# Patient Record
Sex: MALE | Race: WHITE | NOT HISPANIC OR LATINO | Employment: FULL TIME | URBAN - METROPOLITAN AREA
[De-identification: names, ages, dates, MRNs, and addresses within clinical notes are randomized per-mention and may not be internally consistent; named-entity substitution may affect disease eponyms.]

---

## 2018-06-19 PROBLEM — I10 MODERATE HYPERTENSION: Status: ACTIVE | Noted: 2018-06-19

## 2019-07-08 PROBLEM — Z00.00 WELL ADULT EXAM: Status: ACTIVE | Noted: 2019-07-08

## 2019-07-08 PROBLEM — Z12.5 SCREENING FOR PROSTATE CANCER: Status: ACTIVE | Noted: 2019-07-08

## 2019-07-08 PROBLEM — Z79.899 ENCOUNTER FOR LONG-TERM (CURRENT) USE OF MEDICATIONS: Status: ACTIVE | Noted: 2019-07-08

## 2021-03-24 ENCOUNTER — TELEPHONE (OUTPATIENT)
Dept: FAMILY MEDICINE | Facility: CLINIC | Age: 51
End: 2021-03-24

## 2021-04-13 ENCOUNTER — OFFICE VISIT (OUTPATIENT)
Dept: FAMILY MEDICINE | Facility: CLINIC | Age: 51
End: 2021-04-13
Payer: COMMERCIAL

## 2021-04-13 ENCOUNTER — LAB VISIT (OUTPATIENT)
Dept: LAB | Facility: HOSPITAL | Age: 51
End: 2021-04-13
Attending: FAMILY MEDICINE
Payer: COMMERCIAL

## 2021-04-13 VITALS
OXYGEN SATURATION: 97 % | SYSTOLIC BLOOD PRESSURE: 132 MMHG | HEART RATE: 87 BPM | HEIGHT: 68 IN | TEMPERATURE: 98 F | BODY MASS INDEX: 30.29 KG/M2 | DIASTOLIC BLOOD PRESSURE: 92 MMHG | WEIGHT: 199.88 LBS

## 2021-04-13 DIAGNOSIS — J93.83 SPONTANEOUS PNEUMOTHORAX: ICD-10-CM

## 2021-04-13 DIAGNOSIS — R53.83 FATIGUE, UNSPECIFIED TYPE: ICD-10-CM

## 2021-04-13 DIAGNOSIS — Z23 IMMUNIZATION DUE: ICD-10-CM

## 2021-04-13 DIAGNOSIS — Z00.00 WELLNESS EXAMINATION: Primary | ICD-10-CM

## 2021-04-13 DIAGNOSIS — Z00.00 WELLNESS EXAMINATION: ICD-10-CM

## 2021-04-13 DIAGNOSIS — R06.83 SNORING: ICD-10-CM

## 2021-04-13 DIAGNOSIS — I10 ESSENTIAL HYPERTENSION: ICD-10-CM

## 2021-04-13 LAB
ALBUMIN SERPL BCP-MCNC: 4.2 G/DL (ref 3.5–5.2)
ALP SERPL-CCNC: 58 U/L (ref 55–135)
ALT SERPL W/O P-5'-P-CCNC: 26 U/L (ref 10–44)
ANION GAP SERPL CALC-SCNC: 8 MMOL/L (ref 8–16)
AST SERPL-CCNC: 23 U/L (ref 10–40)
BASOPHILS # BLD AUTO: 0.02 K/UL (ref 0–0.2)
BASOPHILS NFR BLD: 0.4 % (ref 0–1.9)
BILIRUB SERPL-MCNC: 1 MG/DL (ref 0.1–1)
BUN SERPL-MCNC: 14 MG/DL (ref 6–20)
CALCIUM SERPL-MCNC: 8.7 MG/DL (ref 8.7–10.5)
CHLORIDE SERPL-SCNC: 104 MMOL/L (ref 95–110)
CHOLEST SERPL-MCNC: 139 MG/DL (ref 120–199)
CHOLEST/HDLC SERPL: 2.6 {RATIO} (ref 2–5)
CO2 SERPL-SCNC: 25 MMOL/L (ref 23–29)
COMPLEXED PSA SERPL-MCNC: 0.55 NG/ML (ref 0–4)
CREAT SERPL-MCNC: 1.3 MG/DL (ref 0.5–1.4)
DIFFERENTIAL METHOD: ABNORMAL
EOSINOPHIL # BLD AUTO: 0 K/UL (ref 0–0.5)
EOSINOPHIL NFR BLD: 0.5 % (ref 0–8)
ERYTHROCYTE [DISTWIDTH] IN BLOOD BY AUTOMATED COUNT: 11.7 % (ref 11.5–14.5)
EST. GFR  (AFRICAN AMERICAN): >60 ML/MIN/1.73 M^2
EST. GFR  (NON AFRICAN AMERICAN): >60 ML/MIN/1.73 M^2
GLUCOSE SERPL-MCNC: 102 MG/DL (ref 70–110)
HCT VFR BLD AUTO: 44.8 % (ref 40–54)
HDLC SERPL-MCNC: 53 MG/DL (ref 40–75)
HDLC SERPL: 38.1 % (ref 20–50)
HGB BLD-MCNC: 15.6 G/DL (ref 14–18)
IMM GRANULOCYTES # BLD AUTO: 0.01 K/UL (ref 0–0.04)
IMM GRANULOCYTES NFR BLD AUTO: 0.2 % (ref 0–0.5)
LDLC SERPL CALC-MCNC: 76.8 MG/DL (ref 63–159)
LYMPHOCYTES # BLD AUTO: 1.2 K/UL (ref 1–4.8)
LYMPHOCYTES NFR BLD: 21.9 % (ref 18–48)
MCH RBC QN AUTO: 32 PG (ref 27–31)
MCHC RBC AUTO-ENTMCNC: 34.8 G/DL (ref 32–36)
MCV RBC AUTO: 92 FL (ref 82–98)
MONOCYTES # BLD AUTO: 0.5 K/UL (ref 0.3–1)
MONOCYTES NFR BLD: 8.6 % (ref 4–15)
NEUTROPHILS # BLD AUTO: 3.8 K/UL (ref 1.8–7.7)
NEUTROPHILS NFR BLD: 68.4 % (ref 38–73)
NONHDLC SERPL-MCNC: 86 MG/DL
NRBC BLD-RTO: 0 /100 WBC
PLATELET # BLD AUTO: 249 K/UL (ref 150–450)
PMV BLD AUTO: 11.3 FL (ref 9.2–12.9)
POTASSIUM SERPL-SCNC: 4.9 MMOL/L (ref 3.5–5.1)
PROT SERPL-MCNC: 7.4 G/DL (ref 6–8.4)
RBC # BLD AUTO: 4.87 M/UL (ref 4.6–6.2)
SODIUM SERPL-SCNC: 137 MMOL/L (ref 136–145)
TRIGL SERPL-MCNC: 46 MG/DL (ref 30–150)
WBC # BLD AUTO: 5.48 K/UL (ref 3.9–12.7)

## 2021-04-13 PROCEDURE — 84153 ASSAY OF PSA TOTAL: CPT | Performed by: FAMILY MEDICINE

## 2021-04-13 PROCEDURE — 86803 HEPATITIS C AB TEST: CPT | Performed by: FAMILY MEDICINE

## 2021-04-13 PROCEDURE — 85025 COMPLETE CBC W/AUTO DIFF WBC: CPT | Performed by: FAMILY MEDICINE

## 2021-04-13 PROCEDURE — 36415 COLL VENOUS BLD VENIPUNCTURE: CPT | Mod: PN | Performed by: FAMILY MEDICINE

## 2021-04-13 PROCEDURE — 80053 COMPREHEN METABOLIC PANEL: CPT | Performed by: FAMILY MEDICINE

## 2021-04-13 PROCEDURE — 99999 PR PBB SHADOW E&M-EST. PATIENT-LVL IV: ICD-10-PCS | Mod: PBBFAC,,, | Performed by: FAMILY MEDICINE

## 2021-04-13 PROCEDURE — 80061 LIPID PANEL: CPT | Performed by: FAMILY MEDICINE

## 2021-04-13 PROCEDURE — 99386 PREV VISIT NEW AGE 40-64: CPT | Mod: S$GLB,,, | Performed by: FAMILY MEDICINE

## 2021-04-13 PROCEDURE — 86703 HIV-1/HIV-2 1 RESULT ANTBDY: CPT | Performed by: FAMILY MEDICINE

## 2021-04-13 PROCEDURE — 99999 PR PBB SHADOW E&M-EST. PATIENT-LVL IV: CPT | Mod: PBBFAC,,, | Performed by: FAMILY MEDICINE

## 2021-04-13 PROCEDURE — 99386 PR PREVENTIVE VISIT,NEW,40-64: ICD-10-PCS | Mod: S$GLB,,, | Performed by: FAMILY MEDICINE

## 2021-04-13 RX ORDER — LOSARTAN POTASSIUM 50 MG/1
50 TABLET ORAL DAILY
Qty: 90 TABLET | Refills: 3 | Status: SHIPPED | OUTPATIENT
Start: 2021-04-13 | End: 2022-10-10

## 2021-04-14 LAB — HCV AB SERPL QL IA: NEGATIVE

## 2021-04-15 LAB — HIV 1+2 AB+HIV1 P24 AG SERPL QL IA: NEGATIVE

## 2021-05-10 ENCOUNTER — PATIENT MESSAGE (OUTPATIENT)
Dept: RESEARCH | Facility: HOSPITAL | Age: 51
End: 2021-05-10

## 2021-06-23 ENCOUNTER — OFFICE VISIT (OUTPATIENT)
Dept: FAMILY MEDICINE | Facility: CLINIC | Age: 51
End: 2021-06-23
Payer: COMMERCIAL

## 2021-06-23 ENCOUNTER — TELEPHONE (OUTPATIENT)
Dept: GASTROENTEROLOGY | Facility: CLINIC | Age: 51
End: 2021-06-23

## 2021-06-23 VITALS
TEMPERATURE: 98 F | SYSTOLIC BLOOD PRESSURE: 138 MMHG | HEIGHT: 68 IN | HEART RATE: 83 BPM | OXYGEN SATURATION: 98 % | BODY MASS INDEX: 30.69 KG/M2 | WEIGHT: 202.5 LBS | DIASTOLIC BLOOD PRESSURE: 84 MMHG

## 2021-06-23 DIAGNOSIS — G47.33 MILD OBSTRUCTIVE SLEEP APNEA: ICD-10-CM

## 2021-06-23 DIAGNOSIS — I10 ESSENTIAL HYPERTENSION: Primary | ICD-10-CM

## 2021-06-23 DIAGNOSIS — Z12.11 COLON CANCER SCREENING: ICD-10-CM

## 2021-06-23 PROCEDURE — 99214 OFFICE O/P EST MOD 30 MIN: CPT | Mod: S$GLB,,, | Performed by: FAMILY MEDICINE

## 2021-06-23 PROCEDURE — 99214 PR OFFICE/OUTPT VISIT, EST, LEVL IV, 30-39 MIN: ICD-10-PCS | Mod: S$GLB,,, | Performed by: FAMILY MEDICINE

## 2021-06-23 PROCEDURE — 99999 PR PBB SHADOW E&M-EST. PATIENT-LVL III: ICD-10-PCS | Mod: PBBFAC,,, | Performed by: FAMILY MEDICINE

## 2021-06-23 PROCEDURE — 99999 PR PBB SHADOW E&M-EST. PATIENT-LVL III: CPT | Mod: PBBFAC,,, | Performed by: FAMILY MEDICINE

## 2021-09-30 ENCOUNTER — TELEPHONE (OUTPATIENT)
Dept: UROLOGY | Facility: CLINIC | Age: 51
End: 2021-09-30

## 2021-09-30 RX ORDER — DIAZEPAM 5 MG/1
5 TABLET ORAL 2 TIMES DAILY
COMMUNITY
Start: 2021-09-21 | End: 2021-12-23 | Stop reason: CLARIF

## 2021-09-30 RX ORDER — OXYCODONE AND ACETAMINOPHEN 5; 325 MG/1; MG/1
1 TABLET ORAL
COMMUNITY
Start: 2021-09-21 | End: 2022-01-07

## 2021-09-30 RX ORDER — DOCUSATE SODIUM 100 MG/1
100 CAPSULE, LIQUID FILLED ORAL 2 TIMES DAILY
COMMUNITY
Start: 2021-09-21 | End: 2022-01-07

## 2021-10-01 ENCOUNTER — OFFICE VISIT (OUTPATIENT)
Dept: UROLOGY | Facility: CLINIC | Age: 51
End: 2021-10-01
Payer: COMMERCIAL

## 2021-10-01 VITALS — BODY MASS INDEX: 30.67 KG/M2 | WEIGHT: 202.38 LBS | HEIGHT: 68 IN

## 2021-10-01 DIAGNOSIS — N20.0 KIDNEY STONE: Primary | ICD-10-CM

## 2021-10-01 PROCEDURE — 99999 PR PBB SHADOW E&M-EST. PATIENT-LVL III: CPT | Mod: PBBFAC,,, | Performed by: UROLOGY

## 2021-10-01 PROCEDURE — 99204 PR OFFICE/OUTPT VISIT, NEW, LEVL IV, 45-59 MIN: ICD-10-PCS | Mod: S$GLB,,, | Performed by: UROLOGY

## 2021-10-01 PROCEDURE — 99999 PR PBB SHADOW E&M-EST. PATIENT-LVL III: ICD-10-PCS | Mod: PBBFAC,,, | Performed by: UROLOGY

## 2021-10-01 PROCEDURE — 99204 OFFICE O/P NEW MOD 45 MIN: CPT | Mod: S$GLB,,, | Performed by: UROLOGY

## 2021-10-04 ENCOUNTER — TELEPHONE (OUTPATIENT)
Dept: FAMILY MEDICINE | Facility: CLINIC | Age: 51
End: 2021-10-04

## 2021-10-04 DIAGNOSIS — Z12.11 COLON CANCER SCREENING: Primary | ICD-10-CM

## 2021-10-05 ENCOUNTER — TELEPHONE (OUTPATIENT)
Dept: FAMILY MEDICINE | Facility: CLINIC | Age: 51
End: 2021-10-05

## 2021-10-06 ENCOUNTER — TELEPHONE (OUTPATIENT)
Dept: FAMILY MEDICINE | Facility: CLINIC | Age: 51
End: 2021-10-06

## 2021-10-06 DIAGNOSIS — Z12.11 COLON CANCER SCREENING: Primary | ICD-10-CM

## 2021-10-07 ENCOUNTER — TELEPHONE (OUTPATIENT)
Dept: GASTROENTEROLOGY | Facility: CLINIC | Age: 51
End: 2021-10-07

## 2021-10-25 ENCOUNTER — TELEPHONE (OUTPATIENT)
Dept: UROLOGY | Facility: CLINIC | Age: 51
End: 2021-10-25
Payer: COMMERCIAL

## 2021-10-26 ENCOUNTER — TELEPHONE (OUTPATIENT)
Dept: UROLOGY | Facility: CLINIC | Age: 51
End: 2021-10-26
Payer: COMMERCIAL

## 2021-10-28 ENCOUNTER — TELEPHONE (OUTPATIENT)
Dept: FAMILY MEDICINE | Facility: CLINIC | Age: 51
End: 2021-10-28
Payer: COMMERCIAL

## 2021-10-29 ENCOUNTER — TELEPHONE (OUTPATIENT)
Dept: UROLOGY | Facility: CLINIC | Age: 51
End: 2021-10-29
Payer: COMMERCIAL

## 2021-11-05 ENCOUNTER — TELEPHONE (OUTPATIENT)
Dept: UROLOGY | Facility: CLINIC | Age: 51
End: 2021-11-05
Payer: COMMERCIAL

## 2021-11-08 ENCOUNTER — TELEPHONE (OUTPATIENT)
Dept: UROLOGY | Facility: CLINIC | Age: 51
End: 2021-11-08
Payer: COMMERCIAL

## 2021-11-08 DIAGNOSIS — N20.0 KIDNEY STONE: Primary | ICD-10-CM

## 2021-11-19 ENCOUNTER — TELEPHONE (OUTPATIENT)
Dept: UROLOGY | Facility: CLINIC | Age: 51
End: 2021-11-19
Payer: COMMERCIAL

## 2021-11-19 DIAGNOSIS — N20.0 KIDNEY STONE: Primary | ICD-10-CM

## 2021-12-29 ENCOUNTER — PATIENT MESSAGE (OUTPATIENT)
Dept: UROLOGY | Facility: CLINIC | Age: 51
End: 2021-12-29
Payer: COMMERCIAL

## 2021-12-29 ENCOUNTER — NURSE TRIAGE (OUTPATIENT)
Dept: ADMINISTRATIVE | Facility: CLINIC | Age: 51
End: 2021-12-29
Payer: COMMERCIAL

## 2021-12-29 PROBLEM — N20.0 KIDNEY STONE: Status: ACTIVE | Noted: 2021-12-29

## 2021-12-30 ENCOUNTER — HOSPITAL ENCOUNTER (OUTPATIENT)
Dept: RADIOLOGY | Facility: HOSPITAL | Age: 51
Discharge: HOME | End: 2021-12-30
Attending: UROLOGY
Payer: COMMERCIAL

## 2021-12-30 ENCOUNTER — TELEPHONE (OUTPATIENT)
Dept: UROLOGY | Facility: CLINIC | Age: 51
End: 2021-12-30
Payer: COMMERCIAL

## 2021-12-30 DIAGNOSIS — N20.0 KIDNEY STONE: ICD-10-CM

## 2021-12-30 DIAGNOSIS — N20.0 KIDNEY STONE: Primary | ICD-10-CM

## 2021-12-30 PROCEDURE — 74176 CT RENAL STONE STUDY ABD PELVIS WO: ICD-10-PCS | Mod: 26,,, | Performed by: RADIOLOGY

## 2021-12-30 PROCEDURE — 74176 CT ABD & PELVIS W/O CONTRAST: CPT | Mod: TC,PO

## 2021-12-30 PROCEDURE — 74176 CT ABD & PELVIS W/O CONTRAST: CPT | Mod: 26,,, | Performed by: RADIOLOGY

## 2021-12-31 PROBLEM — N20.1 URETERAL STONE: Status: ACTIVE | Noted: 2021-12-31

## 2022-01-03 ENCOUNTER — TELEPHONE (OUTPATIENT)
Dept: UROLOGY | Facility: CLINIC | Age: 52
End: 2022-01-03
Payer: COMMERCIAL

## 2022-01-03 ENCOUNTER — PATIENT MESSAGE (OUTPATIENT)
Dept: UROLOGY | Facility: CLINIC | Age: 52
End: 2022-01-03
Payer: COMMERCIAL

## 2022-01-03 DIAGNOSIS — J90 PLEURAL EFFUSION: Primary | ICD-10-CM

## 2022-01-03 RX ORDER — POLYETHYLENE GLYCOL 3350 17 G/17G
POWDER, FOR SOLUTION ORAL
COMMUNITY
Start: 2021-10-25 | End: 2022-01-07

## 2022-01-03 RX ORDER — PROMETHAZINE HYDROCHLORIDE 25 MG/1
25 TABLET ORAL
COMMUNITY
Start: 2021-10-25 | End: 2022-01-07

## 2022-01-03 RX ORDER — ERYTHROMYCIN 5 MG/G
OINTMENT OPHTHALMIC
COMMUNITY
Start: 2021-12-20 | End: 2022-01-07

## 2022-01-03 RX ORDER — OXYBUTYNIN CHLORIDE 5 MG/1
5 TABLET ORAL 3 TIMES DAILY
COMMUNITY
Start: 2021-12-31 | End: 2022-01-27 | Stop reason: CLARIF

## 2022-01-03 RX ORDER — CALCIUM CARBONATE/VITAMIN D3 400-133.3
TABLET ORAL
COMMUNITY
Start: 2021-10-25 | End: 2022-01-07

## 2022-01-03 NOTE — TELEPHONE ENCOUNTER
----- Message from Ligia Stern sent at 1/3/2022  8:17 AM CST -----  Regarding: appointment  Contact: Wife, Luana Craft want to speak with a nurse regarding scheduling follow up appointment today please call back at [phone]    Case number [id number]

## 2022-01-07 ENCOUNTER — HOSPITAL ENCOUNTER (OUTPATIENT)
Dept: RADIOLOGY | Facility: HOSPITAL | Age: 52
Discharge: HOME | End: 2022-01-07
Attending: UROLOGY
Payer: COMMERCIAL

## 2022-01-07 ENCOUNTER — PATIENT MESSAGE (OUTPATIENT)
Dept: UROLOGY | Facility: CLINIC | Age: 52
End: 2022-01-07

## 2022-01-07 ENCOUNTER — OFFICE VISIT (OUTPATIENT)
Dept: UROLOGY | Facility: CLINIC | Age: 52
End: 2022-01-07
Payer: COMMERCIAL

## 2022-01-07 ENCOUNTER — TELEPHONE (OUTPATIENT)
Dept: UROLOGY | Facility: CLINIC | Age: 52
End: 2022-01-07

## 2022-01-07 VITALS — BODY MASS INDEX: 28.9 KG/M2 | HEIGHT: 68 IN | WEIGHT: 190.69 LBS

## 2022-01-07 DIAGNOSIS — J90 PLEURAL EFFUSION: ICD-10-CM

## 2022-01-07 DIAGNOSIS — N20.0 KIDNEY STONE: Primary | ICD-10-CM

## 2022-01-07 DIAGNOSIS — N20.0 KIDNEY STONE: ICD-10-CM

## 2022-01-07 DIAGNOSIS — J90 PLEURAL EFFUSION: Primary | ICD-10-CM

## 2022-01-07 PROCEDURE — 71046 X-RAY EXAM CHEST 2 VIEWS: CPT | Mod: TC,FY,PO

## 2022-01-07 PROCEDURE — 99999 PR PBB SHADOW E&M-EST. PATIENT-LVL III: ICD-10-PCS | Mod: PBBFAC,,, | Performed by: UROLOGY

## 2022-01-07 PROCEDURE — 99024 PR POST-OP FOLLOW-UP VISIT: ICD-10-PCS | Mod: S$GLB,,, | Performed by: UROLOGY

## 2022-01-07 PROCEDURE — 1160F PR REVIEW ALL MEDS BY PRESCRIBER/CLIN PHARMACIST DOCUMENTED: ICD-10-PCS | Mod: CPTII,S$GLB,, | Performed by: UROLOGY

## 2022-01-07 PROCEDURE — 3008F BODY MASS INDEX DOCD: CPT | Mod: CPTII,S$GLB,, | Performed by: UROLOGY

## 2022-01-07 PROCEDURE — 99024 POSTOP FOLLOW-UP VISIT: CPT | Mod: S$GLB,,, | Performed by: UROLOGY

## 2022-01-07 PROCEDURE — 71046 X-RAY EXAM CHEST 2 VIEWS: CPT | Mod: 26,,, | Performed by: RADIOLOGY

## 2022-01-07 PROCEDURE — 1159F MED LIST DOCD IN RCRD: CPT | Mod: CPTII,S$GLB,, | Performed by: UROLOGY

## 2022-01-07 PROCEDURE — 71046 XR CHEST PA AND LATERAL: ICD-10-PCS | Mod: 26,,, | Performed by: RADIOLOGY

## 2022-01-07 PROCEDURE — 99999 PR PBB SHADOW E&M-EST. PATIENT-LVL III: CPT | Mod: PBBFAC,,, | Performed by: UROLOGY

## 2022-01-07 PROCEDURE — 3008F PR BODY MASS INDEX (BMI) DOCUMENTED: ICD-10-PCS | Mod: CPTII,S$GLB,, | Performed by: UROLOGY

## 2022-01-07 PROCEDURE — 1160F RVW MEDS BY RX/DR IN RCRD: CPT | Mod: CPTII,S$GLB,, | Performed by: UROLOGY

## 2022-01-07 PROCEDURE — 1159F PR MEDICATION LIST DOCUMENTED IN MEDICAL RECORD: ICD-10-PCS | Mod: CPTII,S$GLB,, | Performed by: UROLOGY

## 2022-01-07 NOTE — LETTER
2022    YOANDY Randle M.D.  [address] [place] [address]      Re: Lonnie Vega          1970    To whom it may concern:    Mr Vega is presently under my care and has had surgery recently for kidney stone.  At this time he is not medically fit to fly.    If there is any further information that is needed, please do not hesitate to contact this office.    Thank you,        YOANDY Randle M.D.

## 2022-01-07 NOTE — PROGRESS NOTES
Subjective:       Patient ID: Lonnie Vega is a 51 y.o. male.    Chief Complaint: post op    HPI     51-year-old with a large left kidney stone.  Status post left PCNL complicated by left pleural effusion.  He underwent pleurocentesis.  He is overall improved today.  He still has some pain.  He has mild shortness of breath but he feels that this has improved.  Reviewed his last series of images.  He still has a 1.2 cm stone fragment and upper pole calyx which could not removed through the percutaneous site.  He is here to discuss treatment options.  We discussed shockwave lithotripsy versus ureteroscopy.  The stone was fairly dense and composed of calcium oxalate monohydrate.    Review of Systems   Constitutional: Negative for fever.   Genitourinary: Negative for dysuria and hematuria.       Objective:      Physical Exam  Vitals reviewed.   Constitutional:       Appearance: He is well-developed and well-nourished.   Pulmonary:      Effort: Pulmonary effort is normal.   Skin:     Findings: No rash.   Neurological:      Mental Status: He is alert and oriented to person, place, and time.   Psychiatric:         Mood and Affect: Mood and affect normal.         Assessment:       1. Kidney stone        Plan:       Kidney stone      repeat chest x-ray.  Will plan to proceed with left-sided ureteroscopy in a couple of weeks.

## 2022-01-07 NOTE — LETTER
2022    YOANDY Randle M.D.  [address] [place] [address]      Re: Lonnie Vega          1970    To whom it may concern:    Mr Vega is presently under my care for a complicated obstructed kidney stone.  At this time he is not medically fit to travel.    If there is any further information that is needed, please do not hesitate to contact this office.    Thank you,        YOANDY Randle M.D.

## 2022-01-10 ENCOUNTER — TELEPHONE (OUTPATIENT)
Dept: UROLOGY | Facility: CLINIC | Age: 52
End: 2022-01-10
Payer: COMMERCIAL

## 2022-01-11 ENCOUNTER — TELEPHONE (OUTPATIENT)
Dept: UROLOGY | Facility: CLINIC | Age: 52
End: 2022-01-11
Payer: COMMERCIAL

## 2022-01-11 DIAGNOSIS — Z01.818 PRE-OP TESTING: Primary | ICD-10-CM

## 2022-01-11 NOTE — TELEPHONE ENCOUNTER
----- Message from Ashleigh Meyers sent at 1/11/2022 12:31 PM CST -----  Type: Needs Medical Advice  Who Called:  pt wife Luz Elena Espana Call Back Number: [phone] (home)     Additional Information: Pt is scheduled for a procedure but would llike to push it back a week per his wife. Please call to advise

## 2022-01-11 NOTE — TELEPHONE ENCOUNTER
Unable to return patient call as vm is full, returning patient wife telephone call regarding rescheduling procedure. Patient has been moved to 2/1/2022 , will need Covid testing to be done here @[facility] on 1/29/2022.

## 2022-01-24 ENCOUNTER — PATIENT MESSAGE (OUTPATIENT)
Dept: UROLOGY | Facility: CLINIC | Age: 52
End: 2022-01-24
Payer: COMMERCIAL

## 2022-01-24 NOTE — TELEPHONE ENCOUNTER
A little bit of blood would not be very concerning with an indwelling ureteral stent.  If he feels he has an infection, please have him drop off a urine specimen this week so we can test it before his surgery next week

## 2022-01-31 ENCOUNTER — PATIENT MESSAGE (OUTPATIENT)
Dept: UROLOGY | Facility: CLINIC | Age: 52
End: 2022-01-31
Payer: COMMERCIAL

## 2022-01-31 ENCOUNTER — TELEPHONE (OUTPATIENT)
Dept: UROLOGY | Facility: CLINIC | Age: 52
End: 2022-01-31
Payer: COMMERCIAL

## 2022-01-31 NOTE — TELEPHONE ENCOUNTER
----- Message from Denice Fleming sent at 1/31/2022  1:23 PM CST -----  .Type: Needs Medical Advice  Who Called:  Pt  Best Call Back Number: [phone]  Additional Information: Pt requesting to speak with a nurse to get instructions and arrival time for his procedure scheduled for tomorrow.  Please advise  Thanks

## 2022-02-04 ENCOUNTER — PATIENT MESSAGE (OUTPATIENT)
Dept: UROLOGY | Facility: CLINIC | Age: 52
End: 2022-02-04
Payer: COMMERCIAL

## 2022-02-11 ENCOUNTER — TELEPHONE (OUTPATIENT)
Dept: UROLOGY | Facility: CLINIC | Age: 52
End: 2022-02-11
Payer: COMMERCIAL

## 2022-02-21 RX ORDER — OLMESARTAN MEDOXOMIL 40 MG/1
20 TABLET ORAL DAILY
COMMUNITY
Start: 2022-01-31

## 2022-02-21 RX ORDER — DIAZEPAM 5 MG/1
TABLET ORAL
COMMUNITY
End: 2022-10-10

## 2022-07-27 ENCOUNTER — TELEPHONE (OUTPATIENT)
Dept: UROLOGY | Facility: CLINIC | Age: 52
End: 2022-07-27
Payer: COMMERCIAL

## 2022-07-27 NOTE — TELEPHONE ENCOUNTER
----- Message from Millie Hobbs sent at 7/27/2022 11:59 AM CDT -----  Type:  Patient Call Back    Who Called: Pt     What is the reqeust in detail: Pt is requesting a call back in regards to medical records for another provider  Dr. Aaron Nunez Phone [phone] fax [phone] . Please Advise     Can the clinic reply by [facility]?    Best Call Back Number: [phone]

## 2022-10-03 ENCOUNTER — TELEPHONE (OUTPATIENT)
Dept: OPHTHALMOLOGY | Facility: CLINIC | Age: 52
End: 2022-10-03
Payer: COMMERCIAL

## 2022-10-03 NOTE — TELEPHONE ENCOUNTER
----- Message from Dang Olivier sent at 9/30/2022  4:14 PM CDT -----  Please call pt to answer questions

## 2022-11-16 ENCOUNTER — OFFICE VISIT (OUTPATIENT)
Dept: OPHTHALMOLOGY | Facility: CLINIC | Age: 52
End: 2022-11-16
Payer: COMMERCIAL

## 2022-11-16 DIAGNOSIS — Z94.7 STATUS POST CORNEAL TRANSPLANT: ICD-10-CM

## 2022-11-16 DIAGNOSIS — H18.623 KERATOCONUS, UNSTABLE, BILATERAL: Primary | ICD-10-CM

## 2022-11-16 PROCEDURE — 1159F MED LIST DOCD IN RCRD: CPT | Mod: CPTII,S$GLB,, | Performed by: OPHTHALMOLOGY

## 2022-11-16 PROCEDURE — 4010F ACE/ARB THERAPY RXD/TAKEN: CPT | Mod: CPTII,S$GLB,, | Performed by: OPHTHALMOLOGY

## 2022-11-16 PROCEDURE — 92004 PR EYE EXAM, NEW PATIENT,COMPREHESV: ICD-10-PCS | Mod: S$GLB,,, | Performed by: OPHTHALMOLOGY

## 2022-11-16 PROCEDURE — 4010F PR ACE/ARB THEARPY RXD/TAKEN: ICD-10-PCS | Mod: CPTII,S$GLB,, | Performed by: OPHTHALMOLOGY

## 2022-11-16 PROCEDURE — 99999 PR PBB SHADOW E&M-EST. PATIENT-LVL III: ICD-10-PCS | Mod: PBBFAC,,, | Performed by: OPHTHALMOLOGY

## 2022-11-16 PROCEDURE — 92004 COMPRE OPH EXAM NEW PT 1/>: CPT | Mod: S$GLB,,, | Performed by: OPHTHALMOLOGY

## 2022-11-16 PROCEDURE — 99999 PR PBB SHADOW E&M-EST. PATIENT-LVL III: CPT | Mod: PBBFAC,,, | Performed by: OPHTHALMOLOGY

## 2022-11-16 PROCEDURE — 1159F PR MEDICATION LIST DOCUMENTED IN MEDICAL RECORD: ICD-10-PCS | Mod: CPTII,S$GLB,, | Performed by: OPHTHALMOLOGY

## 2022-11-16 NOTE — PROGRESS NOTES
HPI    51 y/o male is here for cornea evaluation.  He has had K transplant OS   2001 and wears RGP's OU.  He is having trouble with OS due to a large   amount OD astigmatism.    K transplant 2001  No drops  Last edited by Whit Fleming on 11/16/2022 10:55 AM.            Assessment /Plan     For exam results, see Encounter Report.    Keratoconus, unstable, bilateral    Status post corneal transplant      PKP OS x 20yrs with 10D cyl clear graft  Diff with Sclerals, but good vision when wearing  Monitor for graft failure, then will consider graft revision

## 2023-02-11 ENCOUNTER — HOSPITAL ENCOUNTER (EMERGENCY)
Facility: HOSPITAL | Age: 53
Discharge: HOME | End: 2023-02-11
Attending: EMERGENCY MEDICINE
Payer: COMMERCIAL

## 2023-02-11 ENCOUNTER — ANESTHESIA EVENT (OUTPATIENT)
Dept: SURGERY | Facility: HOSPITAL | Age: 53
End: 2023-02-11
Payer: COMMERCIAL

## 2023-02-11 ENCOUNTER — ANESTHESIA (OUTPATIENT)
Dept: SURGERY | Facility: HOSPITAL | Age: 53
End: 2023-02-11
Payer: COMMERCIAL

## 2023-02-11 ENCOUNTER — TELEPHONE (OUTPATIENT)
Dept: OPHTHALMOLOGY | Facility: CLINIC | Age: 53
End: 2023-02-11
Payer: COMMERCIAL

## 2023-02-11 VITALS
TEMPERATURE: 98 F | SYSTOLIC BLOOD PRESSURE: 117 MMHG | RESPIRATION RATE: 15 BRPM | BODY MASS INDEX: 29.65 KG/M2 | WEIGHT: 195 LBS | DIASTOLIC BLOOD PRESSURE: 61 MMHG | HEART RATE: 74 BPM | OXYGEN SATURATION: 94 %

## 2023-02-11 DIAGNOSIS — S05.32XA RUPTURE OF GLOBE OF EYE FOLLOWING BLUNT TRAUMA, LEFT, INITIAL ENCOUNTER: ICD-10-CM

## 2023-02-11 DIAGNOSIS — S05.32XA RUPTURED GLOBE OF LEFT EYE, INITIAL ENCOUNTER: Primary | ICD-10-CM

## 2023-02-11 PROCEDURE — 37000008 HC ANESTHESIA 1ST 15 MINUTES: Performed by: OPHTHALMOLOGY

## 2023-02-11 PROCEDURE — 99214 PR OFFICE/OUTPT VISIT, EST, LEVL IV, 30-39 MIN: ICD-10-PCS | Mod: 57,,, | Performed by: OPHTHALMOLOGY

## 2023-02-11 PROCEDURE — 96365 THER/PROPH/DIAG IV INF INIT: CPT

## 2023-02-11 PROCEDURE — 96367 TX/PROPH/DG ADDL SEQ IV INF: CPT

## 2023-02-11 PROCEDURE — 71000033 HC RECOVERY, INTIAL HOUR: Performed by: OPHTHALMOLOGY

## 2023-02-11 PROCEDURE — 63600175 PHARM REV CODE 636 W HCPCS: Performed by: OPHTHALMOLOGY

## 2023-02-11 PROCEDURE — 66250 FOLLOW-UP SURGERY OF EYE: CPT | Mod: LT,,, | Performed by: OPHTHALMOLOGY

## 2023-02-11 PROCEDURE — 63600175 PHARM REV CODE 636 W HCPCS: Performed by: NURSE ANESTHETIST, CERTIFIED REGISTERED

## 2023-02-11 PROCEDURE — 36000707: Performed by: OPHTHALMOLOGY

## 2023-02-11 PROCEDURE — 99214 OFFICE O/P EST MOD 30 MIN: CPT | Mod: 57,,, | Performed by: OPHTHALMOLOGY

## 2023-02-11 PROCEDURE — 25000003 PHARM REV CODE 250: Performed by: EMERGENCY MEDICINE

## 2023-02-11 PROCEDURE — 37000009 HC ANESTHESIA EA ADD 15 MINS: Performed by: OPHTHALMOLOGY

## 2023-02-11 PROCEDURE — 36000706: Performed by: OPHTHALMOLOGY

## 2023-02-11 PROCEDURE — 25000003 PHARM REV CODE 250: Performed by: STUDENT IN AN ORGANIZED HEALTH CARE EDUCATION/TRAINING PROGRAM

## 2023-02-11 PROCEDURE — 63600175 PHARM REV CODE 636 W HCPCS: Performed by: EMERGENCY MEDICINE

## 2023-02-11 PROCEDURE — 99285 EMERGENCY DEPT VISIT HI MDM: CPT | Mod: 25

## 2023-02-11 PROCEDURE — 99284 PR EMERGENCY DEPT VISIT,LEVEL IV: ICD-10-PCS | Mod: FS,,, | Performed by: EMERGENCY MEDICINE

## 2023-02-11 PROCEDURE — 96375 TX/PRO/DX INJ NEW DRUG ADDON: CPT

## 2023-02-11 PROCEDURE — 25000003 PHARM REV CODE 250: Performed by: OPHTHALMOLOGY

## 2023-02-11 PROCEDURE — D9220A PRA ANESTHESIA: ICD-10-PCS | Mod: CRNA,,, | Performed by: NURSE ANESTHETIST, CERTIFIED REGISTERED

## 2023-02-11 PROCEDURE — 67005 PARTIAL REMOVAL OF EYE FLUID: CPT | Mod: LT,,, | Performed by: OPHTHALMOLOGY

## 2023-02-11 PROCEDURE — 63600175 PHARM REV CODE 636 W HCPCS: Performed by: STUDENT IN AN ORGANIZED HEALTH CARE EDUCATION/TRAINING PROGRAM

## 2023-02-11 PROCEDURE — D9220A PRA ANESTHESIA: Mod: ANES,,, | Performed by: ANESTHESIOLOGY

## 2023-02-11 PROCEDURE — D9220A PRA ANESTHESIA: ICD-10-PCS | Mod: ANES,,, | Performed by: ANESTHESIOLOGY

## 2023-02-11 PROCEDURE — 99284 EMERGENCY DEPT VISIT MOD MDM: CPT | Mod: FS,,, | Performed by: EMERGENCY MEDICINE

## 2023-02-11 PROCEDURE — 67005 PR PART REMV VITREOUS,ANT APPRCH: ICD-10-PCS | Mod: LT,,, | Performed by: OPHTHALMOLOGY

## 2023-02-11 PROCEDURE — 66250: ICD-10-PCS | Mod: LT,,, | Performed by: OPHTHALMOLOGY

## 2023-02-11 PROCEDURE — D9220A PRA ANESTHESIA: Mod: CRNA,,, | Performed by: NURSE ANESTHETIST, CERTIFIED REGISTERED

## 2023-02-11 PROCEDURE — 71000016 HC POSTOP RECOV ADDL HR: Performed by: OPHTHALMOLOGY

## 2023-02-11 PROCEDURE — 25000003 PHARM REV CODE 250: Performed by: NURSE ANESTHETIST, CERTIFIED REGISTERED

## 2023-02-11 PROCEDURE — 71000015 HC POSTOP RECOV 1ST HR: Performed by: OPHTHALMOLOGY

## 2023-02-11 RX ORDER — DEXAMETHASONE SODIUM PHOSPHATE 4 MG/ML
INJECTION, SOLUTION INTRA-ARTICULAR; INTRALESIONAL; INTRAMUSCULAR; INTRAVENOUS; SOFT TISSUE
Status: DISCONTINUED | OUTPATIENT
Start: 2023-02-11 | End: 2023-02-11 | Stop reason: HOSPADM

## 2023-02-11 RX ORDER — ONDANSETRON 2 MG/ML
4 INJECTION INTRAMUSCULAR; INTRAVENOUS
Status: DISCONTINUED | OUTPATIENT
Start: 2023-02-11 | End: 2023-02-11

## 2023-02-11 RX ORDER — ACETAMINOPHEN 325 MG/1
650 TABLET ORAL EVERY 4 HOURS PRN
Status: DISCONTINUED | OUTPATIENT
Start: 2023-02-11 | End: 2023-02-11

## 2023-02-11 RX ORDER — PROPOFOL 10 MG/ML
VIAL (ML) INTRAVENOUS
Status: DISCONTINUED | OUTPATIENT
Start: 2023-02-11 | End: 2023-02-11

## 2023-02-11 RX ORDER — EPINEPHRINE 1 MG/ML
INJECTION, SOLUTION, CONCENTRATE INTRAVENOUS
Status: DISCONTINUED
Start: 2023-02-11 | End: 2023-02-11 | Stop reason: HOSPADM

## 2023-02-11 RX ORDER — HYDROMORPHONE HYDROCHLORIDE 2 MG/ML
INJECTION, SOLUTION INTRAMUSCULAR; INTRAVENOUS; SUBCUTANEOUS
Status: DISCONTINUED | OUTPATIENT
Start: 2023-02-11 | End: 2023-02-11

## 2023-02-11 RX ORDER — MIDAZOLAM HYDROCHLORIDE 1 MG/ML
INJECTION, SOLUTION INTRAMUSCULAR; INTRAVENOUS
Status: DISCONTINUED | OUTPATIENT
Start: 2023-02-11 | End: 2023-02-11

## 2023-02-11 RX ORDER — DEXAMETHASONE SODIUM PHOSPHATE 4 MG/ML
INJECTION, SOLUTION INTRA-ARTICULAR; INTRALESIONAL; INTRAMUSCULAR; INTRAVENOUS; SOFT TISSUE
Status: DISCONTINUED | OUTPATIENT
Start: 2023-02-11 | End: 2023-02-11

## 2023-02-11 RX ORDER — DIPHENHYDRAMINE HYDROCHLORIDE 50 MG/ML
INJECTION INTRAMUSCULAR; INTRAVENOUS
Status: DISCONTINUED | OUTPATIENT
Start: 2023-02-11 | End: 2023-02-11

## 2023-02-11 RX ORDER — CEFAZOLIN SODIUM 1 G/3ML
1 INJECTION, POWDER, FOR SOLUTION INTRAMUSCULAR; INTRAVENOUS
Status: DISCONTINUED | OUTPATIENT
Start: 2023-02-11 | End: 2023-02-11

## 2023-02-11 RX ORDER — ACETAMINOPHEN 10 MG/ML
INJECTION, SOLUTION INTRAVENOUS
Status: DISCONTINUED | OUTPATIENT
Start: 2023-02-11 | End: 2023-02-11

## 2023-02-11 RX ORDER — VANCOMYCIN HYDROCHLORIDE 500 MG/10ML
INJECTION, POWDER, LYOPHILIZED, FOR SOLUTION INTRAVENOUS
Status: DISCONTINUED
Start: 2023-02-11 | End: 2023-02-11 | Stop reason: HOSPADM

## 2023-02-11 RX ORDER — SUCCINYLCHOLINE CHLORIDE 20 MG/ML
INJECTION INTRAMUSCULAR; INTRAVENOUS
Status: DISCONTINUED | OUTPATIENT
Start: 2023-02-11 | End: 2023-02-11

## 2023-02-11 RX ORDER — ONDANSETRON 2 MG/ML
4 INJECTION INTRAMUSCULAR; INTRAVENOUS DAILY PRN
Status: DISCONTINUED | OUTPATIENT
Start: 2023-02-11 | End: 2023-02-11

## 2023-02-11 RX ORDER — ONDANSETRON 2 MG/ML
INJECTION INTRAMUSCULAR; INTRAVENOUS
Status: DISCONTINUED | OUTPATIENT
Start: 2023-02-11 | End: 2023-02-11

## 2023-02-11 RX ORDER — OXYCODONE AND ACETAMINOPHEN 5; 325 MG/1; MG/1
1 TABLET ORAL
Status: COMPLETED | OUTPATIENT
Start: 2023-02-11 | End: 2023-02-11

## 2023-02-11 RX ORDER — MOXIFLOXACIN HYDROCHLORIDE 400 MG/1
400 TABLET ORAL DAILY
Qty: 10 TABLET | Refills: 0 | Status: SHIPPED | OUTPATIENT
Start: 2023-02-11 | End: 2023-02-25

## 2023-02-11 RX ORDER — CIPROFLOXACIN 2 MG/ML
400 INJECTION, SOLUTION INTRAVENOUS
Status: COMPLETED | OUTPATIENT
Start: 2023-02-11 | End: 2023-02-11

## 2023-02-11 RX ORDER — FENTANYL CITRATE 50 UG/ML
INJECTION, SOLUTION INTRAMUSCULAR; INTRAVENOUS
Status: DISCONTINUED | OUTPATIENT
Start: 2023-02-11 | End: 2023-02-11

## 2023-02-11 RX ORDER — PHENYLEPHRINE HYDROCHLORIDE 10 MG/ML
INJECTION INTRAVENOUS
Status: DISCONTINUED | OUTPATIENT
Start: 2023-02-11 | End: 2023-02-11

## 2023-02-11 RX ORDER — DEXAMETHASONE SODIUM PHOSPHATE 4 MG/ML
INJECTION, SOLUTION INTRA-ARTICULAR; INTRALESIONAL; INTRAMUSCULAR; INTRAVENOUS; SOFT TISSUE
Status: DISCONTINUED
Start: 2023-02-11 | End: 2023-02-11 | Stop reason: HOSPADM

## 2023-02-11 RX ORDER — NEOMYCIN SULFATE, POLYMYXIN B SULFATE, AND DEXAMETHASONE 3.5; 10000; 1 MG/G; [USP'U]/G; MG/G
OINTMENT OPHTHALMIC
Status: DISCONTINUED
Start: 2023-02-11 | End: 2023-02-11 | Stop reason: HOSPADM

## 2023-02-11 RX ORDER — HYDROMORPHONE HYDROCHLORIDE 1 MG/ML
0.2 INJECTION, SOLUTION INTRAMUSCULAR; INTRAVENOUS; SUBCUTANEOUS EVERY 5 MIN PRN
Status: DISCONTINUED | OUTPATIENT
Start: 2023-02-11 | End: 2023-02-11

## 2023-02-11 RX ORDER — LIDOCAINE HYDROCHLORIDE 20 MG/ML
INJECTION INTRAVENOUS
Status: DISCONTINUED | OUTPATIENT
Start: 2023-02-11 | End: 2023-02-11

## 2023-02-11 RX ORDER — FENTANYL CITRATE 50 UG/ML
25 INJECTION, SOLUTION INTRAMUSCULAR; INTRAVENOUS EVERY 5 MIN PRN
Status: DISCONTINUED | OUTPATIENT
Start: 2023-02-11 | End: 2023-02-11

## 2023-02-11 RX ORDER — VANCOMYCIN HYDROCHLORIDE 500 MG/10ML
INJECTION, POWDER, LYOPHILIZED, FOR SOLUTION INTRAVENOUS
Status: DISCONTINUED | OUTPATIENT
Start: 2023-02-11 | End: 2023-02-11 | Stop reason: HOSPADM

## 2023-02-11 RX ORDER — MOXIFLOXACIN 5 MG/ML
1 SOLUTION/ DROPS OPHTHALMIC 4 TIMES DAILY
Qty: 3 ML | Refills: 0 | Status: SHIPPED | OUTPATIENT
Start: 2023-02-11 | End: 2023-02-18

## 2023-02-11 RX ORDER — EPINEPHRINE CONVENIENCE KIT 1 MG/ML(1)
KIT INTRAMUSCULAR; SUBCUTANEOUS
Status: DISCONTINUED | OUTPATIENT
Start: 2023-02-11 | End: 2023-02-11 | Stop reason: HOSPADM

## 2023-02-11 RX ORDER — SODIUM CHLORIDE 0.9 % (FLUSH) 0.9 %
10 SYRINGE (ML) INJECTION
Status: DISCONTINUED | OUTPATIENT
Start: 2023-02-11 | End: 2023-02-11

## 2023-02-11 RX ORDER — PREDNISOLONE ACETATE 10 MG/ML
1 SUSPENSION/ DROPS OPHTHALMIC 4 TIMES DAILY
Qty: 5 ML | Refills: 11 | Status: SHIPPED | OUTPATIENT
Start: 2023-02-11 | End: 2023-03-13

## 2023-02-11 RX ADMIN — PROPOFOL 150 MG: 10 INJECTION, EMULSION INTRAVENOUS at 03:02

## 2023-02-11 RX ADMIN — CIPROFLOXACIN 400 MG: 2 INJECTION, SOLUTION INTRAVENOUS at 01:02

## 2023-02-11 RX ADMIN — PHENYLEPHRINE HYDROCHLORIDE 100 MCG: 10 INJECTION INTRAVENOUS at 03:02

## 2023-02-11 RX ADMIN — HYDROMORPHONE HYDROCHLORIDE 0.5 MG: 2 INJECTION INTRAMUSCULAR; INTRAVENOUS; SUBCUTANEOUS at 04:02

## 2023-02-11 RX ADMIN — SUCCINYLCHOLINE CHLORIDE 180 MG: 20 INJECTION, SOLUTION INTRAMUSCULAR; INTRAVENOUS at 03:02

## 2023-02-11 RX ADMIN — OXYCODONE HYDROCHLORIDE AND ACETAMINOPHEN 1 TABLET: 5; 325 TABLET ORAL at 11:02

## 2023-02-11 RX ADMIN — DIPHENHYDRAMINE HYDROCHLORIDE 10 MG: 50 INJECTION, SOLUTION INTRAMUSCULAR; INTRAVENOUS at 03:02

## 2023-02-11 RX ADMIN — ONDANSETRON 8 MG: 2 INJECTION INTRAMUSCULAR; INTRAVENOUS at 03:02

## 2023-02-11 RX ADMIN — DEXTROSE MONOHYDRATE 1 G: 2.5 INJECTION INTRAVENOUS at 02:02

## 2023-02-11 RX ADMIN — FENTANYL CITRATE 100 MCG: 50 INJECTION, SOLUTION INTRAMUSCULAR; INTRAVENOUS at 03:02

## 2023-02-11 RX ADMIN — PHENYLEPHRINE HYDROCHLORIDE 200 MCG: 10 INJECTION INTRAVENOUS at 03:02

## 2023-02-11 RX ADMIN — LIDOCAINE HYDROCHLORIDE 100 MG: 20 INJECTION INTRAVENOUS at 03:02

## 2023-02-11 RX ADMIN — SUGAMMADEX 200 MG: 100 INJECTION, SOLUTION INTRAVENOUS at 04:02

## 2023-02-11 RX ADMIN — PHENYLEPHRINE HYDROCHLORIDE 200 MCG: 10 INJECTION INTRAVENOUS at 04:02

## 2023-02-11 RX ADMIN — ACETAMINOPHEN 1000 MG: 10 INJECTION, SOLUTION INTRAVENOUS at 03:02

## 2023-02-11 RX ADMIN — DEXAMETHASONE SODIUM PHOSPHATE 8 MG: 4 INJECTION, SOLUTION INTRAMUSCULAR; INTRAVENOUS at 03:02

## 2023-02-11 RX ADMIN — SODIUM CHLORIDE, SODIUM GLUCONATE, SODIUM ACETATE, POTASSIUM CHLORIDE, MAGNESIUM CHLORIDE, SODIUM PHOSPHATE, DIBASIC, AND POTASSIUM PHOSPHATE: .53; .5; .37; .037; .03; .012; .00082 INJECTION, SOLUTION INTRAVENOUS at 04:02

## 2023-02-11 RX ADMIN — MIDAZOLAM HYDROCHLORIDE 2 MG: 1 INJECTION, SOLUTION INTRAMUSCULAR; INTRAVENOUS at 03:02

## 2023-02-11 RX ADMIN — SODIUM CHLORIDE, SODIUM GLUCONATE, SODIUM ACETATE, POTASSIUM CHLORIDE, MAGNESIUM CHLORIDE, SODIUM PHOSPHATE, DIBASIC, AND POTASSIUM PHOSPHATE: .53; .5; .37; .037; .03; .012; .00082 INJECTION, SOLUTION INTRAVENOUS at 03:02

## 2023-02-11 NOTE — OP NOTE
SURGEON: GEORGIANA PARSON MD              RESIDENT: ANISH CERON MD     DATE OF PROCEDURE:  2/11/23     PREOPERATIVE DIAGNOSES:  Ruptured Globe; Dehisced PKP, aphakia left eye     POSTOPERATIVE DIAGNOSES:  same                                                                               PROCEDURE PERFORMED: 1. Repair of globe rupture, left eye  2. Anterior vitrectomy left eye     ANESTHESIA:  General endotracheal tube anesthesia     COMPLICATIONS:  None     ESTIMATED BLOOD LOSS:  None.       SPECIMEN: NONE     INDICATIONS FOR PROCEDURE:  Blunt trauma to right eye resulting in inferior dehiscence of PKP graft and extrusion of crystalline lens as mechanism for open globe   Risks, benefits, and alternatives were discussed for the attempted repair.  Patient understands these risks and desires to proceed with surgery.  The consent was signed and the patient again agreed to the risks and benefits of the planned procedure.     PROCEDURE IN DETAIL:  The patient was met in the preop holding area.  The correct operative site was marked.  The consent was confirmed to be signed. The patient was taken to the operating room by the anesthesia team and placed in the supine position.  After appropriate anesthesia was obtained,  the left eye was very carefully prepped and draped in the sterile ophthalmic fashion.  A  Cameron wire lid speculum was used to retract the patient's eyelids without placing downward pressure.  The microscope was brought into position.  The eye was carefully examined and both Healon and BSS were instilled through open PKP wound inferiorly to re-inflate the eye.  An anterior vitrectomy was performed to rid the anterior chamber of vitreous.  Five inferior sutures (10-0 nylon) were noted to be broken.  These were removed and a cardinal, bisecting suture was place in center of graft flap near 6oclock (10-0 nylon).  There was no evidence of vitreous or uveal prolapse when examined visually and with weck-yue  sponges. The remaining interrupted sutures were done using a 10-0 nylon suture.  All of the knots were trimmed and buried.  Miochol and  Balanced salt was used to irrigate the anterior chamber.  The anterior chamber was then deepened with balanced salt solution and the wound was found to be Jessica negati ve.  The eye was filled with physiologic pressure.  Injection of vancomycin and Decadron was placed into inferior fornix subconjunctivally. Maxitrol ointment was placed into the eye.  After the lid speculum was removed, the eye was washed, dried, patched and shielded.  The patient was successfully extubated and the postop care was discussed with the patient's family.  Patient is scheduled to follow up tomorrow.

## 2023-02-11 NOTE — CONSULTS
Consultation Report  Ophthalmology Service    Date: 02/11/2023    Chief complaint/Reason for Consult: open globe left eye     History of Present Illness: Lonnie Vega is a 52 y.o. male with POHx keratoconus OU, PKP OS 20 years ago at [facility] who presents with concern for dehisced PKP and open globe left eye.  At 1000 this morning, patient accidentally poked left eye with thumb, immediately noticed a large gush of fluid and a small amount of blood and severe decrease in vision.  Patient call on call [facility]  and spoke with note writer whom instructed him to come immediately to [facility] main.  Patient still has slow fluid leakage from the eye and mild discomfort without alfredo pain.  No changes in left eye vision.  Last meal 0830 this morning.  No allergies.     Patient denies any visual changes, visual disturbances, such as flashes, floaters, or curtain-veil in visual field, and ocular discomfort in the right eye.     POcularHx: Keratoconus OU, PKP OS 20 years ago at [facility]    Current eye gtts: Denies     Family Hx: Denies family history of glaucoma, macular degeneration, or blindness. family history includes Hypertension in his mother; Prostate cancer in his paternal uncle; Pulmonary fibrosis in his father.     PMHx:  has a past medical history of Anticoagulant long-term use and Hypertension.     PSurgHx:  has a past surgical history that includes Knee surgery; Elbow surgery; Corneal transplant; Mouth surgery; Hand surgery (11/2019); Percutaneous nephrostomy (N/A, 12/27/2021); Percutaneous nephrolithotomy (PCNL) using laser (Left, 12/28/2021); Cystoureteroscopy with retrograde pyelography and insertion of stent into ureter (Left, 12/30/2021); Cystoureteroscopy with retrograde pyelography and insertion of stent into ureter (Left, 02/01/2022); Laser lithotripsy (Left, 02/01/2022); Extracorporeal shock wave lithotripsy (Left, 10/12/2022); Tendon repair (Left); Cystoureteroscopy with retrograde pyelography and  insertion of stent into ureter (Left, 12/6/2022); and Cystoscopy with calculus extraction (Left, 12/6/2022).     Home Medications:   Prior to Admission medications    Medication Sig Start Date End Date Taking? Authorizing Provider   amLODIPine (NORVASC) 5 MG tablet Take 5 mg by mouth once daily.   Yes Historical Provider   olmesartan (BENICAR) 40 MG tablet Take 40 mg by mouth once daily. 1/31/22  Yes Historical Provider   rosuvastatin (CRESTOR) 5 MG tablet Take 5 mg by mouth once daily.   Yes Historical Provider   acyclovir (ZOVIRAX) 400 MG tablet Take 1 tablet (400 mg total) by mouth 2 (two) times daily.  Patient taking differently: Take 400 mg by mouth daily as needed. 8/17/20 12/2/22  Arnoldo Harp, DO   HYDROcodone-acetaminophen (NORCO) 5-325 mg per tablet Take 1 tablet by mouth every 6 (six) hours as needed for Pain. 12/6/22   MD cassandra Feldmanmandies.phos-phsal-hyo (URIBEL) 118-10-40.8-36 mg Cap Take 1 capsule by mouth every 8 (eight) hours as needed (dysuria). 12/6/22   Aaron Nunez MD   omega-3 fatty acids 300 mg Cap Take 2 capsules by mouth once daily. [facility] FISH OIL 1000 MG CAPS  Patient taking differently: Take 2 capsules by mouth once daily. [facility] FISH OIL 1000 MG CAPS  On Hold for surgery 1-7-22 5/23/17   Loyd Velasquez, DO   vitamin D (VITAMIN D3) 1000 units Tab Take 1,000 Units by mouth once daily.    Historical Provider        Medications this encounter:    ceFAZolin (ANCEF) IVPB  1 g Intravenous Once    ciprofloxacin  400 mg Intravenous ED 1 Time    ondansetron  4 mg Intravenous ED 1 Time       Allergies: is allergic to no known drug allergies.     Social:  reports that he has never smoked. He has never been exposed to tobacco smoke. He has never used smokeless tobacco. He reports current alcohol use of about 2.0 standard drinks per week. He reports that he does not use drugs.     ROS: As per HPI    Ocular examination/Dilated fundus examination:  Base Eye Exam       Visual  Acuity (Snellen - Linear)         Right Left    Dist sc 20/100 HM at face    Dist ph sc 20/60               Tonometry (Applanation, 1:05 PM)         Right Left    Pressure 18 deferred              Pupils         Dark Light Shape React    Right 5 3 Round Brisk    Left   peaked               Visual Fields         Right Left     Full               Neuro/Psych       Oriented x3: Yes    Mood/Affect: Normal              Dilation       Right eye: 1% Mydriacyl, 2.5% Phenylephrine @ 1:05 PM                  Slit Lamp and Fundus Exam       External Exam         Right Left    External Normal Normal              Slit Lamp Exam         Right Left    Lids/Lashes Dermatochalasis - upper lid, Blepharitis clear fluid coating face    Conjunctiva/Sclera White and quiet Tr injection    Cornea Clear PKP graft mostly in place, several clock hours of dehiscence, graft mostly clear, bradly positive    Anterior Chamber Deep and quiet flat, small area of heme overlying iris at 3 o clock    Iris Round and reactive early peaked pupil    Lens Nuclear sclerosis unable to examine given deferred dilation    Anterior Vitreous Normal unable to examine given deferred dilation              Fundus Exam         Right Left    Disc Normal unable to examine given deferred dilation    C/D Ratio 0.4     Macula Normal unable to examine given deferred dilation    Vessels Normal     Periphery Normal                   CT Orbits:  - 25% deflation of left medial posterior globe, loss of anterior chamber formation, unable to visualize lens. Right globe appears intact.     Assessment/Plan:     1. Open globe, Dehisced PKP L eye  - anterior chamber flat, slightly peaked pupil, concern for expulsion of native lens as cannot visualize on CT and patient denies prior cataract or other eye surgery.   - NPO. Last meal 0830  - Ancef 1 g IV  - Cipro 400mg IV  - anti-emetics prn  - update tetanus  - pain control as needed  - shield eye at all times, bed rest, avoid blowing  nose or wretching  - plan for emergent OR repair  - patient consented for attempt to close globe and save eye plus all other indicated procedures  - uncertain visual prognosis discussed with primary goal to save eye, patient understands this  - pt likely to discharge home post operatively         To the OR - will need phaco machine and likely anterior vitrectomy setup.     Dw Dr Coles, Dr Lane and Dr Harris      Patient's Best Contact Number: [phone]    Sae Burt MD PGY-2  [employer] [facility] Resident  02/11/2023  12:20 PM

## 2023-02-11 NOTE — ED TRIAGE NOTES
Hx of corneal transplant presents with ruptured left eye. Pt reports hitting eye with thumb and rupturing it this morning

## 2023-02-11 NOTE — ED PROVIDER NOTES
Encounter Date: 2/11/2023       History     Chief Complaint   Patient presents with    Eye Injury     Ruptured cornea, poked finger in eye on accident. Sent here from Urgent Care     52-year-old male, with history of corneal transplant 4 years ago, presents to the ED for eye injury.  Patient reports that he accidentally poked into his left eye which is the same eye that he had corneal transplant on. The accident happened about 2 hours PTA. States that he has severe pain and noticed a gush of fluid came out from his left eye. He called his ophthalmologist, they concerns for rupture globe and recommended him to come to the ED for evaluation.       Review of patient's allergies indicates:   Allergen Reactions    No known drug allergies      Past Medical History:   Diagnosis Date    Anticoagulant long-term use     Hypertension      Past Surgical History:   Procedure Laterality Date    CORNEAL TRANSPLANT      CYSTOSCOPY WITH CALCULUS EXTRACTION Left 12/6/2022    Procedure: CYSTOSCOPY, WITH CALCULUS REMOVAL;  Surgeon: Aaron Nunez MD;  Location: [facility] OR;  Service: Urology;  Laterality: Left;    CYSTOURETEROSCOPY WITH RETROGRADE PYELOGRAPHY AND INSERTION OF STENT INTO URETER Left 12/30/2021    Procedure: CYSTOURETEROSCOPY, WITH RETROGRADE PYELOGRAM AND URETERAL STENT INSERTION;  Surgeon: YOANDY Randle MD;  Location: [facility] OR;  Service: Urology;  Laterality: Left;    CYSTOURETEROSCOPY WITH RETROGRADE PYELOGRAPHY AND INSERTION OF STENT INTO URETER Left 02/01/2022    Procedure: CYSTOURETEROSCOPY, WITH RETROGRADE PYELOGRAM AND URETERAL STENT EXCHANGE;  Surgeon: YOANDY Randle MD;  Location: [facility] OR;  Service: Urology;  Laterality: Left;    CYSTOURETEROSCOPY WITH RETROGRADE PYELOGRAPHY AND INSERTION OF STENT INTO URETER Left 12/6/2022    Procedure: CYSTOURETEROSCOPY, WITH RETROGRADE PYELOGRAM AND URETERAL STENT INSERTION;  Surgeon: Aaron Nunez MD;  Location: [facility] OR;  Service: Urology;  Laterality: Left;     ELBOW SURGERY      EXTRACORPOREAL SHOCK WAVE LITHOTRIPSY Left 10/12/2022    Procedure: LITHOTRIPSY, ESWL;  Surgeon: Aaron Nunez MD;  Location: [facility] OR;  Service: Urology;  Laterality: Left;    HAND SURGERY  11/2019    KNEE SURGERY      LASER LITHOTRIPSY Left 02/01/2022    Procedure: LASER LITHOTRIPSY WITH STONE EXTRACTION;  Surgeon: YOANDY Randle MD;  Location: [facility] OR;  Service: Urology;  Laterality: Left;    MOUTH SURGERY      PERCUTANEOUS NEPHROLITHOTOMY (PCNL) USING LASER Left 12/28/2021    Procedure: NEPHROLITHOTRIPSY, PERCUTANEOUS;  Surgeon: YOANDY Randle MD;  Location: [facility] OR;  Service: Urology;  Laterality: Left;    PERCUTANEOUS NEPHROSTOMY N/A 12/27/2021    Procedure: Creation, Nephrostomy, Percutaneous in cath lab;  Surgeon: Dayo Pagan MD;  Location: [facility] CATH;  Service: Interventional Radiology;  Laterality: N/A;    TENDON REPAIR Left      Family History   Problem Relation Age of Onset    Hypertension Mother     Pulmonary fibrosis Father     Prostate cancer Paternal Uncle      Social History     Tobacco Use    Smoking status: Never     Passive exposure: Never    Smokeless tobacco: Never   Substance Use Topics    Alcohol use: Yes     Alcohol/week: 2.0 standard drinks     Types: 2 Cans of beer per week     Comment: occasional    Drug use: Never     Review of Systems   Constitutional:  Negative for chills and fever.   Eyes:  Positive for pain, discharge (left eye) and visual disturbance.   Cardiovascular:  Positive for chest pain. Negative for leg swelling.   Gastrointestinal:  Negative for abdominal pain, nausea and vomiting.   Genitourinary:  Negative for dysuria and flank pain.   Musculoskeletal:  Negative for back pain and neck pain.   Skin:  Negative for rash.   Neurological:  Positive for headaches. Negative for weakness.   Psychiatric/Behavioral:  Negative for confusion and decreased concentration.      Physical Exam     Initial Vitals   BP Pulse Resp Temp SpO2   02/11/23 1055  02/11/23 1055 02/11/23 1055 02/11/23 1056 02/11/23 1055   135/72 77 18 98 °F (36.7 °C) 98 %      MAP       --                Physical Exam    Nursing note and vitals reviewed.  Constitutional: He appears well-developed. No distress.   HENT:   Head: Normocephalic and atraumatic.   Nose: Nose normal.   Eyes: Left pupil is not round and not reactive.       Neck: No JVD present.   Normal range of motion.  Cardiovascular:  Normal rate, regular rhythm and normal heart sounds.           Pulmonary/Chest: Breath sounds normal. No respiratory distress.   Abdominal: Abdomen is soft. He exhibits no distension. There is no abdominal tenderness.   Musculoskeletal:         General: No tenderness or edema. Normal range of motion.      Cervical back: Normal range of motion.     Neurological: He is alert and oriented to person, place, and time. He has normal strength. No cranial nerve deficit.   Skin: Skin is warm and dry. Capillary refill takes less than 2 seconds.       ED Course   Procedures  Labs Reviewed - No data to display       Imaging Results              CT ORBITS WITHOUT CONTRAST (Final result)  Result time 02/11/23 12:04:05      Final result by Vlad Hayes DO (02/11/23 12:04:05)                   Impression:      Left traumatic globe rupture with partial collapse and deformity of the globe.  Absent visualization of the left glands suggestive for prior lens replacement clinical correlation advised.  Clinical correlation and emergent ophthalmological evaluation recommended.    No evidence for bony orbital fracture.      Electronically signed by: Vlad Hayes DO  Date:    02/11/2023  Time:    12:04               Narrative:    EXAMINATION:  CT ORBITS WITHOUT CONTRAST    CLINICAL HISTORY:  Orbital asymmetry;    TECHNIQUE:  2.5 mm axial images of the orbits without contrast with coronal reformatted imaging from the axial acquisition    Comparison:None    COMPARISON:  None    FINDINGS:  There is deformity of the left globe  with reduced volume and irregularity of the margins with buccal configuration most compatible with traumatic globe rupture.  There is absence of the lens most compatible with prior lens replacement clinical correlation advised..    There is normal contour of the right globe with native lens identified.  No evidence for bony orbital fracture.  Mild patchy mucosal thickening ethmoid air cells and left inferior frontal sinus.  Small lobular opacity right maxillary antra suggestive for mucous retention cyst.    COMMUNICATION  This critical result was discovered/received at 1145 BM.  The critical information above was relayed directly by me by telephone to Dr. Montenegro on 02/11/2023 at 11:47.                                       Medications   ondansetron injection 4 mg (4 mg Intravenous Not Given 2/11/23 1215)   EPINEPHrine (PF) (ADRENALIN) 1 mg/mL (1 mL) injection (has no administration in time range)   dexAMETHasone (DECADRON) 4 mg/mL injection (has no administration in time range)   vancomycin (VANCOCIN) 500 mg injection (has no administration in time range)   sod hyaluronate-sod chondroitin-sod hyaluronate (DUOVISC) 3 %-4 %(0.5 mL) 1 % (0.55 mL) intra-ocular kit (has no administration in time range)   oxyCODONE-acetaminophen 5-325 mg per tablet 1 tablet (1 tablet Oral Given 2/11/23 1136)   ciprofloxacin (CIPRO)400mg/200ml D5W IVPB 400 mg (0 mg Intravenous Stopped 2/11/23 1404)   ceFAZolin (ANCEF) 1 g in dextrose 5 % in water (D5W) 5 % 50 mL IVPB (MB+) (0 g Intravenous Stopped 2/11/23 1444)     Medical Decision Making:   History:   Old Medical Records: I decided to obtain old medical records.  Initial Assessment:   52-year-old male, with history of corneal transplant, presents to the ED for left eye injury.  Patient is in pain distress, anxious, left anterior chamber flattened, distorted pupil.  Defer fluorescein stained to Ophthalmology per patient request.  Differential Diagnosis:   Lab rupture, corneal abrasion,  foreign body  Clinical Tests:   Radiological Study: Ordered and Reviewed  ED Management:  Discussed the case with Ophthalmology, they recommended CT orbit, will evaluate patient bedside.  With give ancef and Cipro for empric coverage. Will give percocet for pain.            ED Course as of 02/11/23 1446   Sat Feb 11, 2023   1442 Patient taken to the OR by Ophthalmology for globe rupture repair. [NC]      ED Course User Index  [NC] Ramon Balderas MD                 Clinical Impression:   Final diagnoses:  [S05.32XA] Rupture of globe of eye following blunt trauma, left, initial encounter               Ramon Balderas MD  Resident  02/11/23 144

## 2023-02-11 NOTE — ED NOTES
Returned to room to find it empty, no notification from anyone. Notified charge nurse who contacted surgery to find out if the pt was there, which he was.

## 2023-02-11 NOTE — TELEPHONE ENCOUNTER
Received phone call from patient who contact [facility] emergency .  Has history of PKP left eye corneal transplant 20 years ago, around 930-1000 this morning he says he accidentally poked/rubbed the left eye and feels he dehisced his corneal transplant very apparently, feels the graft dislodged, felt a fluid gush and had immediate decrease in vision.  I instructed patient to come to [facility] main campus ED immediately for evaluation, remain NPO, shield and protect eye without any pressure.  Patient will arrive via private transport.  Will contact ED to inform of patient arrival, approximately around [id number].

## 2023-02-11 NOTE — ANESTHESIA PREPROCEDURE EVALUATION
02/11/2023  Lonnie Vega is a 52 y.o., male.  Pre-operative evaluation for Procedure(s) (LRB):  REPAIR, OPEN GLOBE (Left)    Lonnie Vega is a 52 y.o. male   Poked finger in eye- blood and immediate loss of vision  NPO since 0830    Patient Active Problem List   Diagnosis    Essential hypertension    Encounter for long-term (current) use of medications    Spontaneous pneumothorax    Mild obstructive sleep apnea    Kidney stone    Pleural effusion on left    Ureteral stone       Past Surgical History:   Procedure Laterality Date    CORNEAL TRANSPLANT      CYSTOSCOPY WITH CALCULUS EXTRACTION Left 12/6/2022    Procedure: CYSTOSCOPY, WITH CALCULUS REMOVAL;  Surgeon: Aaron Nunez MD;  Location: [facility] OR;  Service: Urology;  Laterality: Left;    CYSTOURETEROSCOPY WITH RETROGRADE PYELOGRAPHY AND INSERTION OF STENT INTO URETER Left 12/30/2021    Procedure: CYSTOURETEROSCOPY, WITH RETROGRADE PYELOGRAM AND URETERAL STENT INSERTION;  Surgeon: YOANDY Randle MD;  Location: [facility] OR;  Service: Urology;  Laterality: Left;    CYSTOURETEROSCOPY WITH RETROGRADE PYELOGRAPHY AND INSERTION OF STENT INTO URETER Left 02/01/2022    Procedure: CYSTOURETEROSCOPY, WITH RETROGRADE PYELOGRAM AND URETERAL STENT EXCHANGE;  Surgeon: YOANDY Randle MD;  Location: [facility] OR;  Service: Urology;  Laterality: Left;    CYSTOURETEROSCOPY WITH RETROGRADE PYELOGRAPHY AND INSERTION OF STENT INTO URETER Left 12/6/2022    Procedure: CYSTOURETEROSCOPY, WITH RETROGRADE PYELOGRAM AND URETERAL STENT INSERTION;  Surgeon: Aaron Nunez MD;  Location: [facility] OR;  Service: Urology;  Laterality: Left;    ELBOW SURGERY      EXTRACORPOREAL SHOCK WAVE LITHOTRIPSY Left 10/12/2022    Procedure: LITHOTRIPSY, ESWL;  Surgeon: Aaron Nunez MD;  Location: [facility] OR;  Service: Urology;  Laterality: Left;    HAND SURGERY   11/2019    KNEE SURGERY      LASER LITHOTRIPSY Left 02/01/2022    Procedure: LASER LITHOTRIPSY WITH STONE EXTRACTION;  Surgeon: YOANDY Randle MD;  Location: [facility] OR;  Service: Urology;  Laterality: Left;    MOUTH SURGERY      PERCUTANEOUS NEPHROLITHOTOMY (PCNL) USING LASER Left 12/28/2021    Procedure: NEPHROLITHOTRIPSY, PERCUTANEOUS;  Surgeon: YOANDY Randle MD;  Location: [facility] OR;  Service: Urology;  Laterality: Left;    PERCUTANEOUS NEPHROSTOMY N/A 12/27/2021    Procedure: Creation, Nephrostomy, Percutaneous in cath lab;  Surgeon: Dayo Pagan MD;  Location: [facility] CATH;  Service: Interventional Radiology;  Laterality: N/A;    TENDON REPAIR Left        Social History     Socioeconomic History    Marital status:      Spouse name: TON     Number of children: 2   Tobacco Use    Smoking status: Never     Passive exposure: Never    Smokeless tobacco: Never   Substance and Sexual Activity    Alcohol use: Yes     Alcohol/week: 2.0 standard drinks     Types: 2 Cans of beer per week     Comment: occasional    Drug use: Never    Sexual activity: Yes     Partners: Female   Social History Narrative    Job :      Exercise : Yes     Diet : Normal        Current Facility-Administered Medications on File Prior to Encounter   Medication Dose Route Frequency Provider Last Rate Last Admin    ciprofloxacin (CIPRO)400mg/200ml D5W IVPB 400 mg  400 mg Intravenous On Call Procedure Aaron Nunez  mL/hr at 12/06/22 0742 400 mg at 12/06/22 0742    lactated ringers infusion   Intravenous Continuous Joe Mc MD   Stopped at 02/01/22 1545    lactated ringers infusion   Intravenous Continuous Jeanna Martines MD   Stopped at 10/12/22 1645    lactated ringers infusion   Intravenous Continuous Jacqueline Volpi-Abadie, MD 20 mL/hr at 12/06/22 0742 Restarted at 12/06/22 0837    LIDOcaine (PF) 10 mg/ml (1%) injection 10 mg  1 mL Other Once Joe Mc MD          Current Outpatient Medications on File Prior to Encounter   Medication Sig Dispense Refill    amLODIPine (NORVASC) 5 MG tablet Take 5 mg by mouth once daily.      olmesartan (BENICAR) 40 MG tablet Take 40 mg by mouth once daily.      rosuvastatin (CRESTOR) 5 MG tablet Take 5 mg by mouth once daily.      acyclovir (ZOVIRAX) 400 MG tablet Take 1 tablet (400 mg total) by mouth 2 (two) times daily. (Patient taking differently: Take 400 mg by mouth daily as needed.) 180 tablet 4    HYDROcodone-acetaminophen (NORCO) 5-325 mg per tablet Take 1 tablet by mouth every 6 (six) hours as needed for Pain. 14 tablet 0    methen-m.blue-s.phos-phsal-hyo (URIBEL) 118-10-40.8-36 mg Cap Take 1 capsule by mouth every 8 (eight) hours as needed (dysuria). 24 capsule 1    omega-3 fatty acids 300 mg Cap Take 2 capsules by mouth once daily. [facility] FISH OIL 1000 MG CAPS (Patient taking differently: Take 2 capsules by mouth once daily. [facility] FISH OIL 1000 MG CAPS  On Hold for surgery 1-7-22) 180 capsule 3    vitamin D (VITAMIN D3) 1000 units Tab Take 1,000 Units by mouth once daily.         Review of patient's allergies indicates:   Allergen Reactions    No known drug allergies          CBC: No results for input(s): WBC, RBC, HGB, HCT, PLT, MCV, MCH, MCHC in the last 72 hours.    CMP: No results for input(s): NA, K, CL, CO2, BUN, CREATININE, GLU, MG, PHOS, CALCIUM, ALBUMIN, PROT, ALKPHOS, ALT, AST, BILITOT in the last 72 hours.    INR  No results for input(s): PT, INR, PROTIME, APTT in the last 72 hours.      Diagnostic Studies:    EKG:   Results for orders placed or performed during the hospital encounter of 12/30/21   EKG 12-lead    Collection Time: 12/30/21  1:44 PM    Narrative    Test Reason : R06.02,    Vent. Rate : 105 BPM     Atrial Rate : 105 BPM     P-R Int : 138 ms          QRS Dur : 096 ms      QT Int : 326 ms       P-R-T Axes : 038 025 023 degrees     QTc Int : 430 ms    Sinus tachycardia  Incomplete right bundle branch  block  Borderline Abnormal ECG  When compared with ECG of 23-DEC-2021 13:44,  Criteria for Anterior infarct are no longer Present  Criteria for Anterolateral infarct are no longer Present  Borderline criteria for Inferior infarct are no longer Present  Confirmed by Zachary WALTER, Bay KOEHLER ([id number]) on 1/3/2022 8:01:16 AM    Referred By: [email or web address]   SELF           Confirmed By:Bay Sharma MD       TTE:  No results found for this or any previous visit.  No results found for: EF   No results found for this or any previous visit.    PHILLIP:  No results found for this or any previous visit.    Stress Test:  No results found for this or any previous visit.       LHC:  No results found for this or any previous visit.       PFT:  No results found for: FEV1, FVC, [id number], TLC, DLCO     ALLERGIES:     Review of patient's allergies indicates:   Allergen Reactions    No known drug allergies      LDA:      Lines/Drains/Airways     Drain  Duration                Ureteral Drain/Stent 02/01/22 1351 Left ureter 6 Fr. 375 days          Peripheral Intravenous Line  Duration                Peripheral IV - Single Lumen 02/11/23 1246 20 G Posterior;Right Hand <1 day               Anesthesia Evaluation      Airway   Mallampati: II  TM distance: > 6 cm  Neck ROM: Normal ROM  Dental    (+) Intact    Pulmonary    (+) sleep apnea,   Cardiovascular   (+) hypertension,     Rate: Normal    Neuro/Psych      GI/Hepatic/Renal      Endo/Other    Abdominal                         Pre-op Assessment    I have reviewed the Patient Summary Reports.     I have reviewed the Nursing Notes. I have reviewed the NPO Status.   I have reviewed the Medications.     Review of Systems  Anesthesia Hx:  No problems with previous Anesthesia  Denies Family Hx of Anesthesia complications.   Denies Personal Hx of Anesthesia complications.   Cardiovascular:   Hypertension    Pulmonary:   Sleep Apnea    Renal/:  Renal/ Normal     Hepatic/GI:  Hepatic/GI Normal     Endocrine:  Endocrine Normal        Physical Exam  General: Well nourished    Airway:  Mallampati: II   Mouth Opening: Normal  TM Distance: > 6 cm  Tongue: Normal  Neck ROM: Normal ROM    Dental:  Intact    Chest/Lungs:  Normal Respiratory Rate    Heart:  Rate: Normal        Anesthesia Plan  Type of Anesthesia, risks & benefits discussed:    Anesthesia Type: Gen ETT  Intra-op Monitoring Plan: Standard ASA Monitors  Post Op Pain Control Plan: multimodal analgesia and IV/PO Opioids PRN  Induction:  IV and rapid sequence  Airway Plan: Direct, Post-Induction  Informed Consent: Informed consent signed with the Patient and all parties understand the risks and agree with anesthesia plan.  All questions answered.   ASA Score: 2 Emergent  Day of Surgery Review of History & Physical: H&P Update referred to the surgeon/provider.    Ready For Surgery From Anesthesia Perspective.     .

## 2023-02-11 NOTE — ANESTHESIA POSTPROCEDURE EVALUATION
Anesthesia Post Evaluation    Patient: Lonnie Vega    Procedure(s) Performed: Procedure(s) (LRB):  REPAIR, OPEN GLOBE (Left)    Final Anesthesia Type: general      Patient location during evaluation: PACU  Patient participation: Yes- Able to Participate  Level of consciousness: awake and alert  Post-procedure vital signs: reviewed and stable  Pain management: adequate  Airway patency: patent    PONV status at discharge: No PONV  Anesthetic complications: no      Cardiovascular status: hemodynamically stable  Respiratory status: spontaneous ventilation  Follow-up not needed.          Vitals Value Taken Time   /63 02/11/23 1717   Temp 36.9 °C (98.4 °F) 02/11/23 1639   Pulse 83 02/11/23 1729   Resp 15 02/11/23 1729   SpO2 93 % 02/11/23 1729   Vitals shown include unvalidated device data.      Event Time   Out of Recovery 17:01:00         Pain/Hermila Score: Pain Rating Prior to Med Admin: 5 (2/11/2023 11:36 AM)  Hermila Score: 9 (2/11/2023  5:01 PM)

## 2023-02-11 NOTE — H&P
Pre-operative History and Physical  Ophthalmology Service    CC: LEFT EYE DEHISCED PKP    HPI:   Patient is a 52 y.o. male presents with an eye problem of left eye dehisced pkp with open globe requiring surgery as noted in the most recent ophthalmology progress note.    Past Medical History:   Diagnosis Date    Anticoagulant long-term use     Hypertension        Past Surgical History:   Procedure Laterality Date    CORNEAL TRANSPLANT      CYSTOSCOPY WITH CALCULUS EXTRACTION Left 12/6/2022    Procedure: CYSTOSCOPY, WITH CALCULUS REMOVAL;  Surgeon: Aaron Nunez MD;  Location: [facility] OR;  Service: Urology;  Laterality: Left;    CYSTOURETEROSCOPY WITH RETROGRADE PYELOGRAPHY AND INSERTION OF STENT INTO URETER Left 12/30/2021    Procedure: CYSTOURETEROSCOPY, WITH RETROGRADE PYELOGRAM AND URETERAL STENT INSERTION;  Surgeon: YOANDY Randle MD;  Location: [facility] OR;  Service: Urology;  Laterality: Left;    CYSTOURETEROSCOPY WITH RETROGRADE PYELOGRAPHY AND INSERTION OF STENT INTO URETER Left 02/01/2022    Procedure: CYSTOURETEROSCOPY, WITH RETROGRADE PYELOGRAM AND URETERAL STENT EXCHANGE;  Surgeon: YOANDY Randle MD;  Location: [facility] OR;  Service: Urology;  Laterality: Left;    CYSTOURETEROSCOPY WITH RETROGRADE PYELOGRAPHY AND INSERTION OF STENT INTO URETER Left 12/6/2022    Procedure: CYSTOURETEROSCOPY, WITH RETROGRADE PYELOGRAM AND URETERAL STENT INSERTION;  Surgeon: Aaron Nunez MD;  Location: [facility] OR;  Service: Urology;  Laterality: Left;    ELBOW SURGERY      EXTRACORPOREAL SHOCK WAVE LITHOTRIPSY Left 10/12/2022    Procedure: LITHOTRIPSY, ESWL;  Surgeon: Aaron Nunez MD;  Location: [facility] OR;  Service: Urology;  Laterality: Left;    HAND SURGERY  11/2019    KNEE SURGERY      LASER LITHOTRIPSY Left 02/01/2022    Procedure: LASER LITHOTRIPSY WITH STONE EXTRACTION;  Surgeon: YOANDY Randle MD;  Location: [facility] OR;  Service: Urology;  Laterality: Left;    MOUTH SURGERY      PERCUTANEOUS NEPHROLITHOTOMY (PCNL)  USING LASER Left 12/28/2021    Procedure: NEPHROLITHOTRIPSY, PERCUTANEOUS;  Surgeon: YOANDY Randle MD;  Location: [facility] OR;  Service: Urology;  Laterality: Left;    PERCUTANEOUS NEPHROSTOMY N/A 12/27/2021    Procedure: Creation, Nephrostomy, Percutaneous in cath lab;  Surgeon: Dayo Pagan MD;  Location: [facility] CATH;  Service: Interventional Radiology;  Laterality: N/A;    TENDON REPAIR Left        Family History   Problem Relation Age of Onset    Hypertension Mother     Pulmonary fibrosis Father     Prostate cancer Paternal Uncle        Social History     Socioeconomic History    Marital status:      Spouse name: TON     Number of children: 2   Tobacco Use    Smoking status: Never     Passive exposure: Never    Smokeless tobacco: Never   Substance and Sexual Activity    Alcohol use: Yes     Alcohol/week: 2.0 standard drinks     Types: 2 Cans of beer per week     Comment: occasional    Drug use: Never    Sexual activity: Yes     Partners: Female   Social History Narrative    Job :      Exercise : Yes     Diet : Normal        Current Facility-Administered Medications   Medication Dose Route Frequency Provider Last Rate Last Admin    ceFAZolin (ANCEF) 1 g in dextrose 5 % in water (D5W) 5 % 50 mL IVPB (MB+)  1 g Intravenous Once Sae Burt MD        ciprofloxacin (CIPRO)400mg/200ml D5W IVPB 400 mg  400 mg Intravenous ED 1 Time Ramon Balderas  mL/hr at 02/11/23 1302 400 mg at 02/11/23 1302    ondansetron injection 4 mg  4 mg Intravenous ED 1 Time Ramon Balderas MD         Current Outpatient Medications   Medication Sig Dispense Refill    amLODIPine (NORVASC) 5 MG tablet Take 5 mg by mouth once daily.      olmesartan (BENICAR) 40 MG tablet Take 40 mg by mouth once daily.      rosuvastatin (CRESTOR) 5 MG tablet Take 5 mg by mouth once daily.      acyclovir (ZOVIRAX) 400 MG tablet Take 1 tablet (400 mg total) by mouth 2 (two) times daily. (Patient taking differently: Take 400 mg by mouth daily  as needed.) 180 tablet 4    HYDROcodone-acetaminophen (NORCO) 5-325 mg per tablet Take 1 tablet by mouth every 6 (six) hours as needed for Pain. 14 tablet 0    methen-m.blue-s.phos-phsal-hyo (URIBEL) 118-10-40.8-36 mg Cap Take 1 capsule by mouth every 8 (eight) hours as needed (dysuria). 24 capsule 1    omega-3 fatty acids 300 mg Cap Take 2 capsules by mouth once daily. [facility] FISH OIL 1000 MG CAPS (Patient taking differently: Take 2 capsules by mouth once daily. [facility] FISH OIL 1000 MG CAPS  On Hold for surgery 1-7-22) 180 capsule 3    vitamin D (VITAMIN D3) 1000 units Tab Take 1,000 Units by mouth once daily.       Facility-Administered Medications Ordered in Other Encounters   Medication Dose Route Frequency Provider Last Rate Last Admin    ciprofloxacin (CIPRO)400mg/200ml D5W IVPB 400 mg  400 mg Intravenous On Call Procedure Aaron Nunez  mL/hr at 12/06/22 0742 400 mg at 12/06/22 0742    lactated ringers infusion   Intravenous Continuous Joe Mc MD   Stopped at 02/01/22 1545    lactated ringers infusion   Intravenous Continuous Jeanna Martines MD   Stopped at 10/12/22 1645    lactated ringers infusion   Intravenous Continuous Jacqueline Volpi-Abadie, MD 20 mL/hr at 12/06/22 0742 Restarted at 12/06/22 0837    LIDOcaine (PF) 10 mg/ml (1%) injection 10 mg  1 mL Other Once Joe Mc MD           Review of patient's allergies indicates:   Allergen Reactions    No known drug allergies          Review of systems:  Gen: denies fevers, chills, nausea, vomitting, weight changes  HEENT: Denies discharge or coughing/sneezing. +blurry vision with leakage of fluid, flat anterior chamber, peaked pupil left eye  Resp: Denies SOB  CV: Denies CP or palpitations  Abd: Denies tenderness, diarrhea, constipation, nausea  Ext:  Denies pain or edema    Physical Exam  BP: Vital signs stable  General: No apparent distress  HEENT: Normocephalic, atraumatic, see last ophthalmology progress note  Lungs: Adequate  respirations, no respiratory distress  Heart: Pulses intact  Abdomen: Soft, no distention  Rectal/pelvic: Deferred    Assessment  Patient is a 52 y.o. male with eye problem of open globe, dehisced PKP left eye, possible expulsed lens left eye in need or surgery.    - Risks/benefits/alternatives of the procedure including, but not limited to scarring, bleeding, infection, loss or decreased vision, and/or need for possible repeat surgery discussed with the patient and/or family, and Informed consent obtained prior to surgery and the patient/family voiced good understanding, witnessed, and placed in chart.  - Will proceed with attempted globe closure, possible phacoemulsification, possible anterior vitrectomy, left eye  - Plan for General anesthesia   - not on blood thinners  - Patient marked and consented  - Class A case, to the OR

## 2023-02-11 NOTE — ED NOTES
Patient identifiers verified and correct for Lonnie Vega  LOC: The patient is awake, alert and aware of environment with an appropriate affect, the patient is oriented x 3 and speaking appropriately.   APPEARANCE: Patient appears comfortable and in no acute distress, patient is clean and well groomed. Left eye covered with towel but no pressure place on eye  SKIN: The skin is warm and dry, color consistent with ethnicity, patient has normal skin turgor and moist mucus membranes, skin intact, no breakdown or bruising noted.   MUSCULOSKELETAL: Patient moving all extremities spontaneously, no swelling noted.  RESPIRATORY: Airway is open and patent, respirations are spontaneous, patient has a normal effort and rate, no accessory muscle use noted, pt placed on continuous pulse ox with O2 sats noted at 97% on room air.  CARDIAC: Patient has a normal rate and regular rhythm, no edema noted, capillary refill < 3 seconds.   GASTRO: Soft and non tender to palpation, no distention noted, normoactive bowel sounds present in all four quadrants. Pt states bowel movements have been regular.  : Pt denies any pain or frequency with urination.  NEURO: Pt opens eyes spontaneously except for left eye, behavior appropriate to situation, follows commands, facial expression symmetrical, bilateral hand grasp equal and even, purposeful motor response noted, normal sensation in all extremities when touched with a finger.

## 2023-02-11 NOTE — BRIEF OP NOTE
BRIEF DISCHARGE NOTE:    Reason for hospitalization -  open globe    Final Diagnosis - s/p open globe repair    Procedures and treatment provided - Status post open globe repair    Disposition at the end of the case - Good.    Patient and family instructions (as appropriate) - Given to patient on discharge      Discharge: to home    The patient tolerated the procedure well and knows to follow up with me tomorrow morning in the eye clinic, sooner if needed.    Cristina Lane MD

## 2023-02-11 NOTE — TRANSFER OF CARE
Anesthesia Transfer of Care Note    Patient: Lonnie Vega    Procedure(s) Performed: Procedure(s) (LRB):  REPAIR, OPEN GLOBE (Left)    Patient location: PACU    Transport from OR: Transported from OR on 6-10 L/min O2 by face mask with adequate spontaneous ventilation    Post pain: adequate analgesia    Post assessment: no apparent anesthetic complications    Post vital signs: stable    Level of consciousness: awake    Nausea/Vomiting: no nausea/vomiting    Complications: none    Transfer of care protocol was followed      Last vitals:   Visit Vitals  /66 (BP Location: Left arm, Patient Position: Lying)   Pulse 97   Temp 36.7 °C (98 °F) (Oral)   Resp 10   Wt 88.5 kg (195 lb)   SpO2 97%   BMI 29.65 kg/m²

## 2023-02-12 ENCOUNTER — DOCUMENTATION ONLY (OUTPATIENT)
Dept: OPHTHALMOLOGY | Facility: CLINIC | Age: 53
End: 2023-02-12
Payer: COMMERCIAL

## 2023-02-12 NOTE — NURSING TRANSFER
Nursing Transfer Note      2/11/2023     Reason patient is being transferred: Discharge Home    Transfer via wheelchair    Transported by ABIGAIL Andrade    PIV removed, discharge instructions given to patient and reviewed with patient & patient's wife. All questions/concerns addressed. Transferred to wheelchair and transported to car.

## 2023-02-12 NOTE — PROGRESS NOTES
Progress Note   Ophthalmology     Subjective: Patient presents for POD1 s/p open globe repair OS from PKP dehiscence with lens extrusion on 2/11/23. Patient has kept eye patch covering eye. Endorses mild throbbing pain.     Objective:   VA OS: CF @ 2 ft   IOP OS: 13    SLE OS only   L/L: normal   C/S: 1+ inj  K: PKP in place, 9 sutures in place inferiorly and Jessica neg, heme at graft interface temporally, edema with D folds   AC: Deep with RBCs   I: Peaked pupil inferiorly   L: aphakic   V: clear       Assessment /Plan   1. s/p open globe repair with dehisced PKP and lens extrusion L eye  - occurred 2/11/23, repaired in OR with Dr. Lane on 2/11/23  - PKP in place and Jessica negative today   - Cont PO Avalox   - Start PF and Vigamox QID left eye   - Start Tobradex sarah beth BID left eye   - Cont eye shield at all times other than when instilling gtts  - Discussed uncertain visual prognosis and importance for close follow up     Patient to see Dr. Lane Tugrayson in [place]     Mirlande Eaton MD

## 2023-02-13 ENCOUNTER — TELEPHONE (OUTPATIENT)
Dept: OPHTHALMOLOGY | Facility: CLINIC | Age: 53
End: 2023-02-13
Payer: COMMERCIAL

## 2023-02-13 NOTE — TELEPHONE ENCOUNTER
----- Message from Sae Burt MD sent at 2/11/2023  5:45 PM CST -----  Regarding: ED/postop f/u  Hello,    This patient was seen in the ED in regards to dehisced PKP and open globe and needs follow up in Dr [facility] on Tuesday 2/14/23. OK per her to add on.  Please contact them to arrange.    Thank you,    Sae Burt  [facility]

## 2023-02-14 ENCOUNTER — OFFICE VISIT (OUTPATIENT)
Dept: OPHTHALMOLOGY | Facility: CLINIC | Age: 53
End: 2023-02-14
Payer: COMMERCIAL

## 2023-02-14 DIAGNOSIS — H18.623 KERATOCONUS, UNSTABLE, BILATERAL: ICD-10-CM

## 2023-02-14 DIAGNOSIS — H27.02 APHAKIA, LEFT: ICD-10-CM

## 2023-02-14 DIAGNOSIS — Z94.7 STATUS POST CORNEAL TRANSPLANT: Primary | ICD-10-CM

## 2023-02-14 PROCEDURE — 99024 PR POST-OP FOLLOW-UP VISIT: ICD-10-PCS | Mod: S$GLB,,, | Performed by: OPHTHALMOLOGY

## 2023-02-14 PROCEDURE — 99999 PR PBB SHADOW E&M-EST. PATIENT-LVL II: CPT | Mod: PBBFAC,,, | Performed by: OPHTHALMOLOGY

## 2023-02-14 PROCEDURE — 99999 PR PBB SHADOW E&M-EST. PATIENT-LVL II: ICD-10-PCS | Mod: PBBFAC,,, | Performed by: OPHTHALMOLOGY

## 2023-02-14 PROCEDURE — 99024 POSTOP FOLLOW-UP VISIT: CPT | Mod: S$GLB,,, | Performed by: OPHTHALMOLOGY

## 2023-02-14 RX ORDER — AMOXICILLIN 500 MG
2 CAPSULE ORAL
COMMUNITY
End: 2023-12-07 | Stop reason: ALTCHOICE

## 2023-02-14 NOTE — PROGRESS NOTES
HPI     Concerns About Ocular Health     Additional comments: ED f/u           Comments    ER pt / [facility] / Denny    [facility] - s/p PKP OS - remote, in carin  S/p Open Globe Repair OS- 2/11/2023  Aphakia OS    Moxi QID OS  PF QID OS  Tobrex BARBARA BID OS    Pt states his left eye is somewhat painful but mostly irritating. No   discharge-just tearing. Vision seems about the same(poor).          Last edited by Cristina Lane MD on 2/14/2023 12:55 PM.            Assessment /Plan     For exam results, see Encounter Report.    Status post corneal transplant    Keratoconus, unstable, bilateral    Aphakia, left      [facility] - s/p PKP OS - remote, in carin    S/p Open Globe Repair OS- 2/11/2023 - dehisced graft and extrusion of lens  - doing well, eye closed, IOP wnl, graft with edema  - cont pf / vigamox qid, avelox    Aphakia OS    Needs retinal eval in light of extruded lens / ant vitrectomy    F/up me 2 wks - va/IOP OS

## 2023-02-20 ENCOUNTER — OFFICE VISIT (OUTPATIENT)
Dept: OPHTHALMOLOGY | Facility: CLINIC | Age: 53
End: 2023-02-20
Payer: COMMERCIAL

## 2023-02-20 DIAGNOSIS — H43.812 POSTERIOR VITREOUS DETACHMENT, LEFT: Primary | ICD-10-CM

## 2023-02-20 PROCEDURE — 92134 CPTRZ OPH DX IMG PST SGM RTA: CPT | Mod: S$GLB,,, | Performed by: OPHTHALMOLOGY

## 2023-02-20 PROCEDURE — 92014 COMPRE OPH EXAM EST PT 1/>: CPT | Mod: S$GLB,,, | Performed by: OPHTHALMOLOGY

## 2023-02-20 PROCEDURE — 99999 PR PBB SHADOW E&M-EST. PATIENT-LVL III: CPT | Mod: PBBFAC,,, | Performed by: OPHTHALMOLOGY

## 2023-02-20 PROCEDURE — 92134 POSTERIOR SEGMENT OCT RETINA (OCULAR COHERENCE TOMOGRAPHY)-BOTH EYES: ICD-10-PCS | Mod: S$GLB,,, | Performed by: OPHTHALMOLOGY

## 2023-02-20 PROCEDURE — 92014 PR EYE EXAM, EST PATIENT,COMPREHESV: ICD-10-PCS | Mod: S$GLB,,, | Performed by: OPHTHALMOLOGY

## 2023-02-20 PROCEDURE — 99999 PR PBB SHADOW E&M-EST. PATIENT-LVL III: ICD-10-PCS | Mod: PBBFAC,,, | Performed by: OPHTHALMOLOGY

## 2023-02-20 NOTE — PROGRESS NOTES
HPI    Retinal eval p;er Dr. Lane  DLS-02/14/2023 Dr. Lane    Pt sts vision is extremely poor and unable to see much out of OS. He   states his pupil is wide open and has not returned to normal and has a lot   of eye fatigue. Light sensitivity, constant dull eye pain OS comes in   waves. He feels the need to keep it covered or symptoms are worse.   OD always seems to want to close and feels fatigued as well. (+)tearing&   mucous (white in color)     [facility] - s/p PKP OS - remote, in carin  S/p Open Globe Repair OS- 2/11/2023  Aphakia OS    EYEMEDS:  Shield at night   Moxi QID OS  PF QID OS  Tobrex BARBARA OS (not currently)    Last edited by Katarzyna Bhatt on 2/20/2023  2:06 PM.          OCT - OD no ME  OS - flat      A/P    Open globe injury  Dehisced PKP repair and anterior vitrectomy from finger for injury 2/11/23 with Domingo  Aphakia from injury  Prior PKP for [facility]    Retina attached    Continue PF QID  Light sensitive - start Atropine Q day    Continue FU with Dr. Lane      4-5 weeks Dilate OS only with OCT

## 2023-02-28 ENCOUNTER — OFFICE VISIT (OUTPATIENT)
Dept: OPHTHALMOLOGY | Facility: CLINIC | Age: 53
End: 2023-02-28
Payer: COMMERCIAL

## 2023-02-28 DIAGNOSIS — Z94.7 STATUS POST CORNEAL TRANSPLANT: Primary | ICD-10-CM

## 2023-02-28 DIAGNOSIS — H27.02 APHAKIA, LEFT: ICD-10-CM

## 2023-02-28 PROCEDURE — 4010F ACE/ARB THERAPY RXD/TAKEN: CPT | Mod: CPTII,S$GLB,, | Performed by: OPHTHALMOLOGY

## 2023-02-28 PROCEDURE — 1159F MED LIST DOCD IN RCRD: CPT | Mod: CPTII,S$GLB,, | Performed by: OPHTHALMOLOGY

## 2023-02-28 PROCEDURE — 1159F PR MEDICATION LIST DOCUMENTED IN MEDICAL RECORD: ICD-10-PCS | Mod: CPTII,S$GLB,, | Performed by: OPHTHALMOLOGY

## 2023-02-28 PROCEDURE — 99024 POSTOP FOLLOW-UP VISIT: CPT | Mod: S$GLB,,, | Performed by: OPHTHALMOLOGY

## 2023-02-28 PROCEDURE — 99999 PR PBB SHADOW E&M-EST. PATIENT-LVL III: CPT | Mod: PBBFAC,,, | Performed by: OPHTHALMOLOGY

## 2023-02-28 PROCEDURE — 4010F PR ACE/ARB THEARPY RXD/TAKEN: ICD-10-PCS | Mod: CPTII,S$GLB,, | Performed by: OPHTHALMOLOGY

## 2023-02-28 PROCEDURE — 99024 PR POST-OP FOLLOW-UP VISIT: ICD-10-PCS | Mod: S$GLB,,, | Performed by: OPHTHALMOLOGY

## 2023-02-28 PROCEDURE — 99999 PR PBB SHADOW E&M-EST. PATIENT-LVL III: ICD-10-PCS | Mod: PBBFAC,,, | Performed by: OPHTHALMOLOGY

## 2023-02-28 RX ORDER — ATROPINE SULFATE 10 MG/ML
1 SOLUTION/ DROPS OPHTHALMIC DAILY
COMMUNITY
End: 2023-12-07 | Stop reason: ALTCHOICE

## 2023-03-06 NOTE — PROGRESS NOTES
HPI     Post-op Evaluation     Additional comments: 2 wk po chk           Comments    ER pt / [facility] / Denny    [facility] - s/p PKP OS - remote, in carin  S/p Open Globe Repair OS- 2/11/2023  Aphakia OS    PF QID OS  Atropine daily OS    Pt states his left eye is somewhat painful (1 on scale). Pt states   photophobia OS. Pt ran out of Vigamox yesterday.           Last edited by Pennie Estrada MA on 2/28/2023  3:19 PM.            Assessment /Plan     For exam results, see Encounter Report.    Status post corneal transplant    Aphakia, left        [facility] - s/p PKP OS - remote, in carin    S/p Open Globe Repair OS- 2/11/2023 - dehisced graft and extrusion of lens  - doing well, eye closed, IOP wnl, graft with much improved edema  - still with light senstivity - unclear if atropine helping or hurting -- will trial off and see how pt feels  - can stop abx, cont PF QID       Aphakia OS

## 2023-03-14 ENCOUNTER — OFFICE VISIT (OUTPATIENT)
Dept: OPHTHALMOLOGY | Facility: CLINIC | Age: 53
End: 2023-03-14
Payer: COMMERCIAL

## 2023-03-14 DIAGNOSIS — Z94.7 STATUS POST CORNEAL TRANSPLANT: Primary | ICD-10-CM

## 2023-03-14 DIAGNOSIS — H27.02 APHAKIA, LEFT: ICD-10-CM

## 2023-03-14 DIAGNOSIS — H18.623 KERATOCONUS, UNSTABLE, BILATERAL: ICD-10-CM

## 2023-03-14 DIAGNOSIS — H43.812 POSTERIOR VITREOUS DETACHMENT, LEFT: ICD-10-CM

## 2023-03-14 PROCEDURE — 99024 POSTOP FOLLOW-UP VISIT: CPT | Mod: S$GLB,,, | Performed by: OPHTHALMOLOGY

## 2023-03-14 PROCEDURE — 99024 PR POST-OP FOLLOW-UP VISIT: ICD-10-PCS | Mod: S$GLB,,, | Performed by: OPHTHALMOLOGY

## 2023-03-14 PROCEDURE — 4010F ACE/ARB THERAPY RXD/TAKEN: CPT | Mod: CPTII,S$GLB,, | Performed by: OPHTHALMOLOGY

## 2023-03-14 PROCEDURE — 99999 PR PBB SHADOW E&M-EST. PATIENT-LVL II: CPT | Mod: PBBFAC,,, | Performed by: OPHTHALMOLOGY

## 2023-03-14 PROCEDURE — 1159F MED LIST DOCD IN RCRD: CPT | Mod: CPTII,S$GLB,, | Performed by: OPHTHALMOLOGY

## 2023-03-14 PROCEDURE — 1159F PR MEDICATION LIST DOCUMENTED IN MEDICAL RECORD: ICD-10-PCS | Mod: CPTII,S$GLB,, | Performed by: OPHTHALMOLOGY

## 2023-03-14 PROCEDURE — 99999 PR PBB SHADOW E&M-EST. PATIENT-LVL II: ICD-10-PCS | Mod: PBBFAC,,, | Performed by: OPHTHALMOLOGY

## 2023-03-14 PROCEDURE — 4010F PR ACE/ARB THEARPY RXD/TAKEN: ICD-10-PCS | Mod: CPTII,S$GLB,, | Performed by: OPHTHALMOLOGY

## 2023-03-17 NOTE — PROGRESS NOTES
HPI    ER pt / O'Mckenzie / Denny    [facility] - s/p PKP OS - remote, in carin  S/p Open Globe Repair OS- 2/11/2023  Aphakia OS    PF QID OS      Pt here today for a 2 week check of OS. Pt stated that there has been no   change in vision. Pt denies eye pain, flashes, and floaters.  Last edited by Kellie Oshea MA on 3/14/2023  2:50 PM.            Assessment /Plan     For exam results, see Encounter Report.    Status post corneal transplant    Aphakia, left    Posterior vitreous detachment, left    Keratoconus, unstable, bilateral          [facility] - s/p PKP OS - remote, in carin    S/p Open Globe Repair OS- 2/11/2023 - dehisced graft and extrusion of lens  - doing well, eye closed, IOP wnl, graft with much improved edema      cont PF QID       Aphakia OS    VA improved with +10 loose lens     Pt resistant to wearing aphakic CL OS for [facility] rehab -->  wishes to discuss secondary sutured IOL --> rec. AM for eval to start discussion of sx in future +/- need for additional PPV.    F/up me 8 wks.

## 2023-04-03 ENCOUNTER — OFFICE VISIT (OUTPATIENT)
Dept: OPHTHALMOLOGY | Facility: CLINIC | Age: 53
End: 2023-04-03
Payer: COMMERCIAL

## 2023-04-03 DIAGNOSIS — H43.812 POSTERIOR VITREOUS DETACHMENT, LEFT: Primary | ICD-10-CM

## 2023-04-03 DIAGNOSIS — H27.02 APHAKIA, LEFT: ICD-10-CM

## 2023-04-03 PROCEDURE — 92201 OPSCPY EXTND RTA DRAW UNI/BI: CPT | Mod: S$GLB,,, | Performed by: OPHTHALMOLOGY

## 2023-04-03 PROCEDURE — 1160F PR REVIEW ALL MEDS BY PRESCRIBER/CLIN PHARMACIST DOCUMENTED: ICD-10-PCS | Mod: CPTII,S$GLB,, | Performed by: OPHTHALMOLOGY

## 2023-04-03 PROCEDURE — 1159F MED LIST DOCD IN RCRD: CPT | Mod: CPTII,S$GLB,, | Performed by: OPHTHALMOLOGY

## 2023-04-03 PROCEDURE — 1160F RVW MEDS BY RX/DR IN RCRD: CPT | Mod: CPTII,S$GLB,, | Performed by: OPHTHALMOLOGY

## 2023-04-03 PROCEDURE — 4010F PR ACE/ARB THEARPY RXD/TAKEN: ICD-10-PCS | Mod: CPTII,S$GLB,, | Performed by: OPHTHALMOLOGY

## 2023-04-03 PROCEDURE — 1159F PR MEDICATION LIST DOCUMENTED IN MEDICAL RECORD: ICD-10-PCS | Mod: CPTII,S$GLB,, | Performed by: OPHTHALMOLOGY

## 2023-04-03 PROCEDURE — 99024 POSTOP FOLLOW-UP VISIT: CPT | Mod: S$GLB,,, | Performed by: OPHTHALMOLOGY

## 2023-04-03 PROCEDURE — 4010F ACE/ARB THERAPY RXD/TAKEN: CPT | Mod: CPTII,S$GLB,, | Performed by: OPHTHALMOLOGY

## 2023-04-03 PROCEDURE — 99024 PR POST-OP FOLLOW-UP VISIT: ICD-10-PCS | Mod: S$GLB,,, | Performed by: OPHTHALMOLOGY

## 2023-04-03 PROCEDURE — 92201 PR OPHTHALMOSCOPY, EXT, W/RET DRAW/SCLERAL DEPR, I&R, UNI/BI: ICD-10-PCS | Mod: S$GLB,,, | Performed by: OPHTHALMOLOGY

## 2023-04-03 PROCEDURE — 99999 PR PBB SHADOW E&M-EST. PATIENT-LVL III: CPT | Mod: PBBFAC,,, | Performed by: OPHTHALMOLOGY

## 2023-04-03 PROCEDURE — 99999 PR PBB SHADOW E&M-EST. PATIENT-LVL III: ICD-10-PCS | Mod: PBBFAC,,, | Performed by: OPHTHALMOLOGY

## 2023-04-03 NOTE — PROGRESS NOTES
HPI    ER pt / O'Rincon / Denny    [facility] - s/p PKP OS - remote, in carin  S/p Open Globe Repair OS- 2/11/2023  Aphakia OS    PF QID OS    VA stable ou, no pain and no f/f.          OCT - OD no ME  OS - flat      A/P    Open globe injury  Dehisced PKP repair and anterior vitrectomy from finger for injury 2/11/23 with Domingo  Aphakia from injury  Prior PKP for [facility]    Retina attached    Pain much improved  No breaks or tears      Continue FU with Dr. Lane      Me PRN if considering sewn in lens (would try CL first)

## 2023-05-09 ENCOUNTER — OFFICE VISIT (OUTPATIENT)
Dept: OPHTHALMOLOGY | Facility: CLINIC | Age: 53
End: 2023-05-09
Payer: COMMERCIAL

## 2023-05-09 DIAGNOSIS — Z94.7 STATUS POST CORNEAL TRANSPLANT: ICD-10-CM

## 2023-05-09 DIAGNOSIS — H18.623 KERATOCONUS, UNSTABLE, BILATERAL: Primary | ICD-10-CM

## 2023-05-09 DIAGNOSIS — H27.02 APHAKIA, LEFT: ICD-10-CM

## 2023-05-09 PROCEDURE — 99024 POSTOP FOLLOW-UP VISIT: CPT | Mod: S$GLB,,, | Performed by: OPHTHALMOLOGY

## 2023-05-09 PROCEDURE — 99024 PR POST-OP FOLLOW-UP VISIT: ICD-10-PCS | Mod: S$GLB,,, | Performed by: OPHTHALMOLOGY

## 2023-05-09 PROCEDURE — 1159F MED LIST DOCD IN RCRD: CPT | Mod: CPTII,S$GLB,, | Performed by: OPHTHALMOLOGY

## 2023-05-09 PROCEDURE — 4010F ACE/ARB THERAPY RXD/TAKEN: CPT | Mod: CPTII,S$GLB,, | Performed by: OPHTHALMOLOGY

## 2023-05-09 PROCEDURE — 1159F PR MEDICATION LIST DOCUMENTED IN MEDICAL RECORD: ICD-10-PCS | Mod: CPTII,S$GLB,, | Performed by: OPHTHALMOLOGY

## 2023-05-09 PROCEDURE — 99999 PR PBB SHADOW E&M-EST. PATIENT-LVL II: CPT | Mod: PBBFAC,,, | Performed by: OPHTHALMOLOGY

## 2023-05-09 PROCEDURE — 4010F PR ACE/ARB THEARPY RXD/TAKEN: ICD-10-PCS | Mod: CPTII,S$GLB,, | Performed by: OPHTHALMOLOGY

## 2023-05-09 PROCEDURE — 99999 PR PBB SHADOW E&M-EST. PATIENT-LVL II: ICD-10-PCS | Mod: PBBFAC,,, | Performed by: OPHTHALMOLOGY

## 2023-05-09 RX ORDER — PREDNISOLONE ACETATE 10 MG/ML
1 SUSPENSION/ DROPS OPHTHALMIC 2 TIMES DAILY
Qty: 5 ML | Refills: 11 | Status: SHIPPED | OUTPATIENT
Start: 2023-05-09 | End: 2024-08-01

## 2023-05-09 RX ORDER — MELOXICAM 15 MG/1
15 TABLET ORAL
COMMUNITY
Start: 2023-04-18 | End: 2023-12-07 | Stop reason: ALTCHOICE

## 2023-05-09 RX ORDER — PREDNISOLONE ACETATE 10 MG/ML
1 SUSPENSION/ DROPS OPHTHALMIC 2 TIMES DAILY
Qty: 5 ML | Refills: 11 | Status: SHIPPED | OUTPATIENT
Start: 2023-05-09 | End: 2023-05-09 | Stop reason: SDUPTHER

## 2023-05-09 NOTE — PROGRESS NOTES
HPI    ER pt / O'Mckenzie / Denny    [facility] - s/p PKP OS - remote, in [facility] - done about 2000  S/p Open Globe Repair OS- 2/11/2023  Aphakia OS    PF QID OS    Pt here today for 8 week f/u. Pt states that vision might have improved   but overall stable. Pt denies pain.   Last edited by Cristina Lane MD on 5/9/2023  4:16 PM.            Assessment /Plan     For exam results, see Encounter Report.    Keratoconus, unstable, bilateral    Aphakia, left    Status post corneal transplant    Other orders  -     Discontinue: prednisoLONE acetate (PRED FORTE) 1 % DrpS; Place 1 drop into the left eye 2 (two) times daily.  Dispense: 5 mL; Refill: 11  -     prednisoLONE acetate (PRED FORTE) 1 % DrpS; Place 1 drop into the left eye 2 (two) times daily.  Dispense: 5 mL; Refill: 11          [facility] - s/p PKP OS - remote, in carin    S/p Open Globe Repair OS- 2/11/2023 - dehisced graft and extrusion of lens  - doing well, eye closed, IOP wnl, graft clear. Sutures intact,      decr PF to 2-3x/day       Aphakia OS    VA improved with +10 loose lens     F/up Dr. Baldwin for hybrid/specialty CL vs. Aphakic RGP fit OS    F/up me 2 mo - va/IOP OS               Letter to denny

## 2023-06-27 ENCOUNTER — OFFICE VISIT (OUTPATIENT)
Dept: OPHTHALMOLOGY | Facility: CLINIC | Age: 53
End: 2023-06-27
Payer: COMMERCIAL

## 2023-06-27 DIAGNOSIS — H27.02 APHAKIA, LEFT: ICD-10-CM

## 2023-06-27 DIAGNOSIS — Z94.7 STATUS POST CORNEAL TRANSPLANT: ICD-10-CM

## 2023-06-27 DIAGNOSIS — H18.623 KERATOCONUS, UNSTABLE, BILATERAL: Primary | ICD-10-CM

## 2023-06-27 DIAGNOSIS — H43.812 POSTERIOR VITREOUS DETACHMENT, LEFT: ICD-10-CM

## 2023-06-27 PROCEDURE — 4010F ACE/ARB THERAPY RXD/TAKEN: CPT | Mod: CPTII,S$GLB,, | Performed by: OPHTHALMOLOGY

## 2023-06-27 PROCEDURE — 1159F PR MEDICATION LIST DOCUMENTED IN MEDICAL RECORD: ICD-10-PCS | Mod: CPTII,S$GLB,, | Performed by: OPHTHALMOLOGY

## 2023-06-27 PROCEDURE — 1159F MED LIST DOCD IN RCRD: CPT | Mod: CPTII,S$GLB,, | Performed by: OPHTHALMOLOGY

## 2023-06-27 PROCEDURE — 99999 PR PBB SHADOW E&M-EST. PATIENT-LVL II: ICD-10-PCS | Mod: PBBFAC,,, | Performed by: OPHTHALMOLOGY

## 2023-06-27 PROCEDURE — 4010F PR ACE/ARB THEARPY RXD/TAKEN: ICD-10-PCS | Mod: CPTII,S$GLB,, | Performed by: OPHTHALMOLOGY

## 2023-06-27 PROCEDURE — 99214 OFFICE O/P EST MOD 30 MIN: CPT | Mod: S$GLB,,, | Performed by: OPHTHALMOLOGY

## 2023-06-27 PROCEDURE — 99214 PR OFFICE/OUTPT VISIT, EST, LEVL IV, 30-39 MIN: ICD-10-PCS | Mod: S$GLB,,, | Performed by: OPHTHALMOLOGY

## 2023-06-27 PROCEDURE — 99999 PR PBB SHADOW E&M-EST. PATIENT-LVL II: CPT | Mod: PBBFAC,,, | Performed by: OPHTHALMOLOGY

## 2023-06-27 NOTE — PROGRESS NOTES
HPI    ER pt / OJodee / Denny    [facility] - s/p PKP OS - remote, in carin - done about 2000  S/p Open Globe Repair OS- 2/11/2023  Aphakia OS    PF BID OS    Pt. Is here for a 4 mo follow up. Pt. States vision has not improved. Pt.   States he will be going to get fit for CL soon. Pt. Denies pain   Last edited by Cristina Lane MD on 6/27/2023  9:59 AM.            Assessment /Plan     For exam results, see Encounter Report.    Keratoconus, unstable, bilateral    Status post corneal transplant    Aphakia, left    Posterior vitreous detachment, left          [facility] - s/p PKP OS - remote, in carin    S/p Open Globe Repair OS- 2/11/2023 - dehisced graft and extrusion of lens  - doing well, eye closed, IOP wnl, graft clear. Sutures intact,     - suture removed with pre and post abx without complication       cont PF to 2-3x/day     Watch IOP    Aphakia OS    VA improved with +10 loose lens     F/up Dr. Baldwin for hybrid/specialty CL vs. Aphakic RGP fit OS    F/up me 3 mo - va/IOP OS

## 2023-12-05 ENCOUNTER — TELEPHONE (OUTPATIENT)
Dept: OPHTHALMOLOGY | Facility: CLINIC | Age: 53
End: 2023-12-05
Payer: COMMERCIAL

## 2023-12-05 NOTE — TELEPHONE ENCOUNTER
----- Message from Jackelyn Morgan sent at 12/5/2023  9:29 AM CST -----  Regarding: appt access  Contact: self @ [phone]  Pt says he is returning Dang's call concerning an appt.  Pls call.

## 2023-12-05 NOTE — TELEPHONE ENCOUNTER
Patient called to schedule VA/IOP ck with Dr. Lane. Offered first available with Dr. Lane at [facility] but patient only wants to be seen on [facility] and wants to be seen before January. Scheduled first availability with OD on [facility]-12/6 @ 2:00 pm with Dr. Gonzales.

## 2023-12-05 NOTE — TELEPHONE ENCOUNTER
----- Message from Gayle Medrano, Patient Care Assistant sent at 12/5/2023  9:10 AM CST -----  Contact: self  Pt is calling  to speak w/ a nurse regarding post op appt [phone]  thanks

## 2023-12-06 ENCOUNTER — OFFICE VISIT (OUTPATIENT)
Dept: OPTOMETRY | Facility: CLINIC | Age: 53
End: 2023-12-06
Payer: COMMERCIAL

## 2023-12-06 DIAGNOSIS — H27.02 APHAKIA OF LEFT EYE: ICD-10-CM

## 2023-12-06 DIAGNOSIS — H18.623 KERATOCONUS, UNSTABLE, BILATERAL: Primary | ICD-10-CM

## 2023-12-06 DIAGNOSIS — Z94.7 STATUS POST CORNEAL TRANSPLANT: ICD-10-CM

## 2023-12-06 PROCEDURE — 1159F MED LIST DOCD IN RCRD: CPT | Mod: CPTII,S$GLB,,

## 2023-12-06 PROCEDURE — 4010F ACE/ARB THERAPY RXD/TAKEN: CPT | Mod: CPTII,S$GLB,,

## 2023-12-06 PROCEDURE — 99999 PR PBB SHADOW E&M-EST. PATIENT-LVL III: CPT | Mod: PBBFAC,,,

## 2023-12-06 PROCEDURE — 99203 OFFICE O/P NEW LOW 30 MIN: CPT | Mod: S$GLB,,,

## 2023-12-06 PROCEDURE — 99203 PR OFFICE/OUTPT VISIT, NEW, LEVL III, 30-44 MIN: ICD-10-PCS | Mod: S$GLB,,,

## 2023-12-06 PROCEDURE — 99999 PR PBB SHADOW E&M-EST. PATIENT-LVL III: ICD-10-PCS | Mod: PBBFAC,,,

## 2023-12-06 PROCEDURE — 1159F PR MEDICATION LIST DOCUMENTED IN MEDICAL RECORD: ICD-10-PCS | Mod: CPTII,S$GLB,,

## 2023-12-06 PROCEDURE — 4010F PR ACE/ARB THEARPY RXD/TAKEN: ICD-10-PCS | Mod: CPTII,S$GLB,,

## 2023-12-06 NOTE — Clinical Note
Seth COOLEY! I saw this pt today - he wanted to be seen in [place] instead. He till has mild haze and edema but otherwise doing ok with a clear graft and sutures in place. IOP continues to be elevated, 25 today. Reports he's taking Pred BID with good compliance. Pt had questions about how much longer to take the drops for and when to follow-up. He has been being fit for specialty lens with Dr. Baldwin, sounds like they're trying the piggyback method. Doesn't seem totally happy with results so far.  Just keeping you in the loop!  Thanks,  Sixto

## 2023-12-07 NOTE — PROGRESS NOTES
1HPI    Pt here for 3 month follow up s/p open globe repair OS    Pt fitted w gas perm w soft lens. Pt using Pred BID OS from February. Pt   denies floaters and flashes. HTN controlled w aid.   Last edited by Lamonte Gonzales, OD on 12/6/2023 11:38 PM.            Assessment /Plan     For exam results, see Encounter Report.    Keratoconus, unstable, bilateral    Status post corneal transplant    Aphakia of left eye      1-3. Longstanding keratoconus with history of PKP OS (several years ago) and open globe repair OS (02/2023) after dehiscence of graft and extrusion of lens. Pt doing well and denies eye pain. Graft largely clear with sutures intact upon examination today. Mild corneal edema with haze present inferiorly. Pt reports good compliance with use of Pred Forte BID OS. IOP remains high on examination today at 25mmHg. Pt denies pain or discomfort. Ed pt on all findings and to continue use of Pred Forte BID OS with good compliance until directed otherwise by Dr. Lane. Pt being followed by Dr. Miguel Angel Baldwin for specialty lens fitting - states they have not found a lens that has been successful yet. Ed pt to RTC asap if any pain or other symptoms arise. Otherwise, follow-up as directed with ophthalmology.     RTC: as determined by cornea, sooner prn

## 2024-01-24 ENCOUNTER — TELEPHONE (OUTPATIENT)
Dept: OPTOMETRY | Facility: CLINIC | Age: 54
End: 2024-01-24
Payer: COMMERCIAL

## 2024-01-24 NOTE — TELEPHONE ENCOUNTER
----- Message from Jackelyn Morgan sent at 1/24/2024  9:08 AM CST -----  Regarding: advice  Contact: self @ [phone]  Pt would like to know if he needs to continue taking the steroid eye drops, prednisoLONE acetate (PRED FORTE) 1 % DrpS.  He says he is out of the medication but has refills on file with his pharmacy and will request his refill from his pharmacy if he needs to continue using it.  Pls call.

## 2024-04-09 RX ORDER — PREDNISOLONE ACETATE 10 MG/ML
SUSPENSION/ DROPS OPHTHALMIC
Qty: 5 ML | Refills: 11 | Status: SHIPPED | OUTPATIENT
Start: 2024-04-09

## 2024-05-28 PROBLEM — Z00.00 ANNUAL PHYSICAL EXAM: Status: ACTIVE | Noted: 2024-05-28

## (undated) DEVICE — SUT 10/0 1X12IN BLK TG160-6

## (undated) DEVICE — APPLICATOR STERILE 3IN

## (undated) DEVICE — CORD BIPOLAR 12 FOOT

## (undated) DEVICE — ERASER HEMSTAT BPLR 23G BLUNT

## (undated) DEVICE — SOL BETADINE 5%

## (undated) DEVICE — Device

## (undated) DEVICE — FORCEP CURVED DISP

## (undated) DEVICE — PROTECTOR CORNEAL CROUCH ST

## (undated) DEVICE — INSTRUMENT SURG SUCT FRZR W/C

## (undated) DEVICE — GAUZE SPONGE 4X4 12PLY

## (undated) DEVICE — TOWEL OR DISP STRL BLUE 4/PK

## (undated) DEVICE — SPONGE WEC CEL SPEARS

## (undated) DEVICE — SYR BULB EAR/ULCER STER 3OZ

## (undated) DEVICE — SUT 10/0 12IN 2MI-175 .5

## (undated) DEVICE — SOL IRR BSS OPHTH 500ML STRL

## (undated) DEVICE — DRAPE EYE POUCH FEN INCISE